# Patient Record
Sex: MALE | Race: WHITE | NOT HISPANIC OR LATINO | URBAN - METROPOLITAN AREA
[De-identification: names, ages, dates, MRNs, and addresses within clinical notes are randomized per-mention and may not be internally consistent; named-entity substitution may affect disease eponyms.]

---

## 2018-12-10 ENCOUNTER — OUTPATIENT (OUTPATIENT)
Dept: EMERGENCY DEPT | Facility: HOSPITAL | Age: 73
LOS: 1 days | Discharge: HOME | End: 2018-12-10
Payer: COMMERCIAL

## 2018-12-10 VITALS
OXYGEN SATURATION: 95 % | RESPIRATION RATE: 14 BRPM | SYSTOLIC BLOOD PRESSURE: 146 MMHG | TEMPERATURE: 98 F | HEART RATE: 88 BPM | DIASTOLIC BLOOD PRESSURE: 78 MMHG

## 2018-12-10 VITALS — WEIGHT: 205.03 LBS | HEIGHT: 72 IN

## 2018-12-10 LAB
ABO RH CONFIRMATION: SIGNIFICANT CHANGE UP
ALBUMIN SERPL ELPH-MCNC: 4.3 G/DL — SIGNIFICANT CHANGE UP (ref 3.3–5)
ALP SERPL-CCNC: 52 U/L — SIGNIFICANT CHANGE UP (ref 40–120)
ALT FLD-CCNC: 31 U/L — SIGNIFICANT CHANGE UP (ref 12–78)
ANION GAP SERPL CALC-SCNC: 9 MMOL/L — SIGNIFICANT CHANGE UP (ref 5–17)
APTT BLD: 27.2 SEC — LOW (ref 27.5–36.3)
AST SERPL-CCNC: 28 U/L — SIGNIFICANT CHANGE UP (ref 15–37)
BASOPHILS # BLD AUTO: 0.04 K/UL — SIGNIFICANT CHANGE UP (ref 0–0.2)
BASOPHILS NFR BLD AUTO: 0.4 % — SIGNIFICANT CHANGE UP (ref 0–2)
BILIRUB SERPL-MCNC: 0.8 MG/DL — SIGNIFICANT CHANGE UP (ref 0.2–1.2)
BLD GP AB SCN SERPL QL: SIGNIFICANT CHANGE UP
BUN SERPL-MCNC: 14 MG/DL — SIGNIFICANT CHANGE UP (ref 7–23)
CALCIUM SERPL-MCNC: 9.1 MG/DL — SIGNIFICANT CHANGE UP (ref 8.5–10.1)
CHLORIDE SERPL-SCNC: 105 MMOL/L — SIGNIFICANT CHANGE UP (ref 96–108)
CO2 SERPL-SCNC: 24 MMOL/L — SIGNIFICANT CHANGE UP (ref 22–31)
CREAT SERPL-MCNC: 0.88 MG/DL — SIGNIFICANT CHANGE UP (ref 0.5–1.3)
EOSINOPHIL # BLD AUTO: 0.03 K/UL — SIGNIFICANT CHANGE UP (ref 0–0.5)
EOSINOPHIL NFR BLD AUTO: 0.3 % — SIGNIFICANT CHANGE UP (ref 0–6)
GLUCOSE SERPL-MCNC: 210 MG/DL — HIGH (ref 70–99)
HCT VFR BLD CALC: 47.2 % — SIGNIFICANT CHANGE UP (ref 39–50)
HGB BLD-MCNC: 16 G/DL — SIGNIFICANT CHANGE UP (ref 13–17)
IMM GRANULOCYTES NFR BLD AUTO: 0.5 % — SIGNIFICANT CHANGE UP (ref 0–1.5)
INR BLD: 1 RATIO — SIGNIFICANT CHANGE UP (ref 0.88–1.16)
LACTATE SERPL-SCNC: 1.5 MMOL/L — SIGNIFICANT CHANGE UP (ref 0.7–2)
LIDOCAIN IGE QN: 141 U/L — SIGNIFICANT CHANGE UP (ref 73–393)
LYMPHOCYTES # BLD AUTO: 1.69 K/UL — SIGNIFICANT CHANGE UP (ref 1–3.3)
LYMPHOCYTES # BLD AUTO: 15.4 % — SIGNIFICANT CHANGE UP (ref 13–44)
MCHC RBC-ENTMCNC: 33.1 PG — SIGNIFICANT CHANGE UP (ref 27–34)
MCHC RBC-ENTMCNC: 33.9 GM/DL — SIGNIFICANT CHANGE UP (ref 32–36)
MCV RBC AUTO: 97.5 FL — SIGNIFICANT CHANGE UP (ref 80–100)
MONOCYTES # BLD AUTO: 0.81 K/UL — SIGNIFICANT CHANGE UP (ref 0–0.9)
MONOCYTES NFR BLD AUTO: 7.4 % — SIGNIFICANT CHANGE UP (ref 2–14)
NEUTROPHILS # BLD AUTO: 8.35 K/UL — HIGH (ref 1.8–7.4)
NEUTROPHILS NFR BLD AUTO: 76 % — SIGNIFICANT CHANGE UP (ref 43–77)
NRBC # BLD: 0 /100 WBCS — SIGNIFICANT CHANGE UP (ref 0–0)
PLATELET # BLD AUTO: 209 K/UL — SIGNIFICANT CHANGE UP (ref 150–400)
POTASSIUM SERPL-MCNC: 4.5 MMOL/L — SIGNIFICANT CHANGE UP (ref 3.5–5.3)
POTASSIUM SERPL-SCNC: 4.5 MMOL/L — SIGNIFICANT CHANGE UP (ref 3.5–5.3)
PROT SERPL-MCNC: 8 GM/DL — SIGNIFICANT CHANGE UP (ref 6–8.3)
PROTHROM AB SERPL-ACNC: 11.1 SEC — SIGNIFICANT CHANGE UP (ref 10–12.9)
RBC # BLD: 4.84 M/UL — SIGNIFICANT CHANGE UP (ref 4.2–5.8)
RBC # FLD: 12.1 % — SIGNIFICANT CHANGE UP (ref 10.3–14.5)
SODIUM SERPL-SCNC: 138 MMOL/L — SIGNIFICANT CHANGE UP (ref 135–145)
TYPE + AB SCN PNL BLD: SIGNIFICANT CHANGE UP
WBC # BLD: 10.97 K/UL — HIGH (ref 3.8–10.5)
WBC # FLD AUTO: 10.97 K/UL — HIGH (ref 3.8–10.5)

## 2018-12-10 PROCEDURE — 93010 ELECTROCARDIOGRAM REPORT: CPT

## 2018-12-10 PROCEDURE — 99285 EMERGENCY DEPT VISIT HI MDM: CPT

## 2018-12-10 RX ORDER — METOPROLOL TARTRATE 50 MG
25 TABLET ORAL DAILY
Qty: 0 | Refills: 0 | Status: DISCONTINUED | OUTPATIENT
Start: 2018-12-10 | End: 2018-12-25

## 2018-12-10 RX ORDER — GLUCAGON INJECTION, SOLUTION 0.5 MG/.1ML
0.2 INJECTION, SOLUTION SUBCUTANEOUS ONCE
Qty: 0 | Refills: 0 | Status: COMPLETED | OUTPATIENT
Start: 2018-12-10 | End: 2018-12-10

## 2018-12-10 RX ORDER — SODIUM CHLORIDE 9 MG/ML
3 INJECTION INTRAMUSCULAR; INTRAVENOUS; SUBCUTANEOUS ONCE
Qty: 0 | Refills: 0 | Status: COMPLETED | OUTPATIENT
Start: 2018-12-10 | End: 2018-12-10

## 2018-12-10 RX ORDER — SODIUM CHLORIDE 9 MG/ML
1000 INJECTION INTRAMUSCULAR; INTRAVENOUS; SUBCUTANEOUS ONCE
Qty: 0 | Refills: 0 | Status: COMPLETED | OUTPATIENT
Start: 2018-12-10 | End: 2018-12-10

## 2018-12-10 RX ADMIN — SODIUM CHLORIDE 1000 MILLILITER(S): 9 INJECTION INTRAMUSCULAR; INTRAVENOUS; SUBCUTANEOUS at 19:45

## 2018-12-10 RX ADMIN — GLUCAGON INJECTION, SOLUTION 0.2 MILLIGRAM(S): 0.5 INJECTION, SOLUTION SUBCUTANEOUS at 19:45

## 2018-12-10 RX ADMIN — SODIUM CHLORIDE 3 MILLILITER(S): 9 INJECTION INTRAMUSCULAR; INTRAVENOUS; SUBCUTANEOUS at 19:47

## 2018-12-10 NOTE — ED STATDOCS - MEDICAL DECISION MAKING DETAILS
Plan to r/o pharyngeal foreign body with CT, labs, GI consultation 73 y/o male with a PMHx of  presents to the ED c/o foreign body throat. Pt states he was eating steak dinner last night at 2100 when a piece of steak got stuck in his throat.  VSS WNL.  Exam epigastric TTP. Plan to r/o pharyngeal foreign body with CT AP.  Tx with glucagon.  Metabolic and hematologic evaluation, GI consultation, symptomatic treatment, admit for endoscopy.  Reassess.

## 2018-12-10 NOTE — CONSULT NOTE ADULT - SUBJECTIVE AND OBJECTIVE BOX
Patient is a 72y old  Male who presents with a chief complaint of     HPI: 71yo male with hx mild intermittent food impaction was eating steak last PM when felt get stuck.  He thought would pass by itself but has persisted.  he can toelrate his own saliva but developing increasing discomfort.  In the past, has passed by itself.    Last EGD years ago    PAST MEDICAL & SURGICAL HISTORY:  HTN   DM    MEDICATIONS  (STANDING):  metformin  metoprolol    MEDICATIONS  (PRN):    Allergies  No Known Allergies  SOCIAL HISTORY: occ etoh    FAMILY HISTORY: nc      REVIEW OF SYSTEMS:    CONSTITUTIONAL: No weakness, fevers or chills  EYES/ENT: No visual changes;  No vertigo or throat pain   NECK: No pain or stiffness  RESPIRATORY: No cough, wheezing, hemoptysis; No shortness of breath  CARDIOVASCULAR: No chest pain or palpitations  GASTROINTESTINAL: as above  GENITOURINARY: No dysuria, frequency or hematuria  NEUROLOGICAL: No numbness or weakness  SKIN: No itching, burning, rashes, or lesions   PSYCH: Normal mood and affect  All other review of systems is negative unless indicated above.    Vital Signs Last 24 Hrs  T(C): 36.3 (10 Dec 2018 18:26), Max: 36.3 (10 Dec 2018 18:26)  T(F): 97.4 (10 Dec 2018 18:26), Max: 97.4 (10 Dec 2018 18:26)  HR: 87 (10 Dec 2018 18:26) (87 - 87)  BP: 149/79 (10 Dec 2018 18:26) (149/79 - 149/79)  BP(mean): --  RR: 18 (10 Dec 2018 18:26) (18 - 18)  SpO2: 99% (10 Dec 2018 18:26) (99% - 99%)    PHYSICAL EXAM:    Constitutional: NAD  HEENT: MMM  Neck: No LAD  Respiratory: CTA  Cardiovascular: S1 and S2  Gastrointestinal: BS+, soft, NT/ND  Extremities: No peripheral edema  Vascular: 2+ peripheral pulses  Neurological: A/O x 3, no focal deficits  Psychiatric: Normal mood, normal affect  Skin: No rashes    LABS:                        16.0   10.97 )-----------( 209      ( 10 Dec 2018 19:07 )             47.2     12-10    138  |  105  |  14  ----------------------------<  210<H>  4.5   |  24  |  0.88    Ca    9.1      10 Dec 2018 19:07    TPro  8.0  /  Alb  4.3  /  TBili  0.8  /  DBili  x   /  AST  28  /  ALT  31  /  AlkPhos  52  12-10    PT/INR - ( 10 Dec 2018 19:07 )   PT: 11.1 sec;   INR: 1.00 ratio         PTT - ( 10 Dec 2018 19:07 )  PTT:27.2 sec  LIVER FUNCTIONS - ( 10 Dec 2018 19:07 )  Alb: 4.3 g/dL / Pro: 8.0 gm/dL / ALK PHOS: 52 U/L / ALT: 31 U/L / AST: 28 U/L / GGT: x             RADIOLOGY & ADDITIONAL STUDIES:

## 2018-12-10 NOTE — ED STATDOCS - ATTENDING CONTRIBUTION TO CARE
SARA Briones MD,  performed the initial face to face bedside interview with this patient regarding history of present illness, review of symptoms and relevant past medical, social and family history.  I completed an independent physical examination.  I was the initial provider who evaluated this patient. I have signed out the follow up of any pending tests (i.e. labs, radiological studies) to the ACP.  I have communicated the patient’s plan of care and disposition with the ACP.

## 2018-12-10 NOTE — ED ADULT TRIAGE NOTE - CHIEF COMPLAINT QUOTE
C/O "something is stuck in my throat. Patient reports eating steak last night around 9 pm when steak got stuck. Patient denies SOB. Reports previous episode and was able to pass on its own.

## 2018-12-10 NOTE — ED STATDOCS - OBJECTIVE STATEMENT
73 y/o male with a PMHx of  presents to the ED c/o foreign body throat. Pt states he was eating steak dinner last night at 2100 when a piece of steak got stuck in his throat. Pt states he tried drinking different fluids to attempt to dislodge the foreign body but was unable to. Pt states when he swallows, the fluids come back up. Pt saw Dr. Kasper today who advised ED visit for further evaluation of foreign body. At time of eval, pt reports the foreign body feels like it is at the level of his chest, reports feeling uncomfortable. Denies fevers, chills, SOB. H/o similar episode in the past, but pt was able to pass foreign body on his own at that time. No other acute complaints at time of eval.

## 2018-12-10 NOTE — ED STATDOCS - NS_ ATTENDINGSCRIBEDETAILS _ED_A_ED_FT
The scribe's documentation has been prepared under my direction and personally reviewed by me in its entirety.  I confirm that the note above accurately reflects all my work, treatment, procedures, and decision making except where otherwise noted or amended by me.  Alexi Briones M.D.

## 2018-12-10 NOTE — ED STATDOCS - PROGRESS NOTE DETAILS
73 y/o M with PMH of DM, HTN presents with possible foreign body in esophagus. Pt states he was eating steak last night and since that time he has had difficulty swallowing. States he is unable to swallow solids or liquids including secretions without vomiting. Reports epigastric pain. States he has experienced similar episode in the past noting "I think it was caused by steak at that time too." Reports no fever, chills, SOB/difficulty breathing. 73 y/o M with PMH of DM, HTN presents with possible foreign body in esophagus. Pt states he was eating steak last night and since that time he has had difficulty swallowing. States he is unable to swallow solids or liquids including secretions without vomiting. Reports epigastric pain. States he has experienced similar episode in the past noting "I think it was caused by steak at that time too." Reports no fever, chills, SOB/difficulty breathing. PE: NAD. Cardiac: s1/s2, RRR. Lungs: CTAB. Abdomen: NBS x4, soft, mild epigastric tenderness. HEENT: oropharynx clear, no oropharyngeal erythema, tonsilar enlargement/exudates, uvular deviation, obvious foreign body. Able to swallow. A/P: concern for foreign body, plan for labs, glucagon, CT abdomen/pelvis. D/w Dr. Shah, recommending admission. Will follow patient in AM. - Sathish Bob PA-C

## 2018-12-10 NOTE — BRIEF OPERATIVE NOTE - PROCEDURE
<<-----Click on this checkbox to enter Procedure Upper endoscopy  12/10/2018  with removal forrign body  Active  DPUROW
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
## 2018-12-21 DIAGNOSIS — K22.10 ULCER OF ESOPHAGUS WITHOUT BLEEDING: ICD-10-CM

## 2018-12-21 DIAGNOSIS — I10 ESSENTIAL (PRIMARY) HYPERTENSION: ICD-10-CM

## 2018-12-21 DIAGNOSIS — T18.128A FOOD IN ESOPHAGUS CAUSING OTHER INJURY, INITIAL ENCOUNTER: ICD-10-CM

## 2018-12-21 DIAGNOSIS — K26.9 DUODENAL ULCER, UNSPECIFIED AS ACUTE OR CHRONIC, WITHOUT HEMORRHAGE OR PERFORATION: ICD-10-CM

## 2018-12-21 DIAGNOSIS — K44.9 DIAPHRAGMATIC HERNIA WITHOUT OBSTRUCTION OR GANGRENE: ICD-10-CM

## 2018-12-21 DIAGNOSIS — E11.9 TYPE 2 DIABETES MELLITUS WITHOUT COMPLICATIONS: ICD-10-CM
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
## 2018-12-21 DIAGNOSIS — Z79.84 LONG TERM (CURRENT) USE OF ORAL HYPOGLYCEMIC DRUGS: ICD-10-CM

## 2019-06-04 PROBLEM — I10 ESSENTIAL (PRIMARY) HYPERTENSION: Chronic | Status: ACTIVE | Noted: 2018-12-26

## 2019-06-04 PROBLEM — E11.9 TYPE 2 DIABETES MELLITUS WITHOUT COMPLICATIONS: Chronic | Status: ACTIVE | Noted: 2018-12-26

## 2019-06-12 ENCOUNTER — APPOINTMENT (OUTPATIENT)
Dept: GASTROENTEROLOGY | Facility: CLINIC | Age: 74
End: 2019-06-12
Payer: MEDICARE

## 2019-06-12 VITALS
BODY MASS INDEX: 28.58 KG/M2 | WEIGHT: 211 LBS | DIASTOLIC BLOOD PRESSURE: 83 MMHG | HEART RATE: 82 BPM | HEIGHT: 72 IN | SYSTOLIC BLOOD PRESSURE: 132 MMHG

## 2019-06-12 DIAGNOSIS — Z78.9 OTHER SPECIFIED HEALTH STATUS: ICD-10-CM

## 2019-06-12 PROCEDURE — 99214 OFFICE O/P EST MOD 30 MIN: CPT

## 2019-06-16 PROBLEM — Z78.9 DOES NOT USE TOBACCO: Status: ACTIVE | Noted: 2019-06-12

## 2019-06-16 PROBLEM — Z78.9 CAFFEINE USE: Status: ACTIVE | Noted: 2019-06-12

## 2019-06-16 PROBLEM — Z78.9 DRINKS WINE: Status: ACTIVE | Noted: 2019-06-12

## 2019-06-16 NOTE — HISTORY OF PRESENT ILLNESS
[de-identified] : This is a 73-year-old gentleman who presents with GERD and dysphagia. The patient notes having had dysphagia for about 20 years. He had upper endoscopy and barium esophagram several years ago which wer enegative and hiss ymptoms were attributed to esophageal spasm. 6 months ago I performed an upper endoscopy for an esophageal food impaction. He notes this is the only time he needed a procedure done urgently. Food is getting stuck into his chest and he is usually able to bring it up or get it down.\par He had a recent episode of steak stuck for hours that passed on the way to the ER

## 2019-06-16 NOTE — ASSESSMENT
[FreeTextEntry1] : 74yo male with dysphagia with hx food impaction \par While he believes is due to spasm I recommended repeat egd to further evaluate\par Risks and benefits of procedure(s) discussed with patient in detail, including but not limited to, perforation, bleeding, reaction to anesthesia.\par

## 2019-06-16 NOTE — PHYSICAL EXAM
[General Appearance - Alert] : alert [General Appearance - In No Acute Distress] : in no acute distress [Sclera] : the sclera and conjunctiva were normal [PERRL With Normal Accommodation] : pupils were equal in size, round, and reactive to light [Outer Ear] : the ears and nose were normal in appearance [Extraocular Movements] : extraocular movements were intact [Oropharynx] : the oropharynx was normal [Neck Appearance] : the appearance of the neck was normal [Neck Cervical Mass (___cm)] : no neck mass was observed [Thyroid Diffuse Enlargement] : the thyroid was not enlarged [Jugular Venous Distention Increased] : there was no jugular-venous distention [Thyroid Nodule] : there were no palpable thyroid nodules [Auscultation Breath Sounds / Voice Sounds] : lungs were clear to auscultation bilaterally [Murmurs] : no murmurs [Heart Rate And Rhythm] : heart rate was normal and rhythm regular [Heart Sounds Gallop] : no gallops [Heart Sounds] : normal S1 and S2 [Full Pulse] : the pedal pulses are present [Heart Sounds Pericardial Friction Rub] : no pericardial rub [Bowel Sounds] : normal bowel sounds [Edema] : there was no peripheral edema [Abdomen Soft] : soft [Abdomen Mass (___ Cm)] : no abdominal mass palpated [Abdomen Tenderness] : non-tender [] : no hepato-splenomegaly [Musculoskeletal - Swelling] : no joint swelling seen [Nail Clubbing] : no clubbing  or cyanosis of the fingernails [Abnormal Walk] : normal gait [Affect] : the affect was normal [Impaired Insight] : insight and judgment were intact [Motor Tone] : muscle strength and tone were normal [Oriented To Time, Place, And Person] : oriented to person, place, and time

## 2021-05-12 ENCOUNTER — RX RENEWAL (OUTPATIENT)
Age: 76
End: 2021-05-12

## 2021-05-12 DIAGNOSIS — Z86.79 PERSONAL HISTORY OF OTHER DISEASES OF THE CIRCULATORY SYSTEM: ICD-10-CM

## 2021-05-12 DIAGNOSIS — N52.9 MALE ERECTILE DYSFUNCTION, UNSPECIFIED: ICD-10-CM

## 2021-05-12 DIAGNOSIS — Z86.69 PERSONAL HISTORY OF OTHER DISEASES OF THE NERVOUS SYSTEM AND SENSE ORGANS: ICD-10-CM

## 2021-05-12 DIAGNOSIS — R22.1 LOCALIZED SWELLING, MASS AND LUMP, NECK: ICD-10-CM

## 2021-05-12 RX ORDER — ASPIRIN ENTERIC COATED TABLETS 81 MG 81 MG/1
81 TABLET, DELAYED RELEASE ORAL
Qty: 90 | Refills: 3 | Status: ACTIVE | COMMUNITY
Start: 2021-05-12

## 2021-05-12 RX ORDER — METOPROLOL SUCCINATE 200 MG/1
TABLET, EXTENDED RELEASE ORAL
Refills: 0 | Status: DISCONTINUED | COMMUNITY
End: 2021-05-12

## 2021-05-12 RX ORDER — METFORMIN HYDROCHLORIDE 500 MG/1
500 TABLET, COATED ORAL
Refills: 0 | Status: DISCONTINUED | COMMUNITY
End: 2021-05-12

## 2021-05-13 ENCOUNTER — NON-APPOINTMENT (OUTPATIENT)
Age: 76
End: 2021-05-13

## 2021-05-13 DIAGNOSIS — Z87.891 PERSONAL HISTORY OF NICOTINE DEPENDENCE: ICD-10-CM

## 2021-05-13 DIAGNOSIS — Z72.89 OTHER PROBLEMS RELATED TO LIFESTYLE: ICD-10-CM

## 2021-05-13 DIAGNOSIS — Z82.49 FAMILY HISTORY OF ISCHEMIC HEART DISEASE AND OTHER DISEASES OF THE CIRCULATORY SYSTEM: ICD-10-CM

## 2021-07-06 ENCOUNTER — APPOINTMENT (OUTPATIENT)
Dept: FAMILY MEDICINE | Facility: CLINIC | Age: 76
End: 2021-07-06
Payer: MEDICARE

## 2021-07-06 ENCOUNTER — NON-APPOINTMENT (OUTPATIENT)
Age: 76
End: 2021-07-06

## 2021-07-06 VITALS
DIASTOLIC BLOOD PRESSURE: 80 MMHG | HEIGHT: 72 IN | OXYGEN SATURATION: 98 % | SYSTOLIC BLOOD PRESSURE: 126 MMHG | TEMPERATURE: 97.2 F | HEART RATE: 79 BPM | WEIGHT: 208 LBS | BODY MASS INDEX: 28.17 KG/M2

## 2021-07-06 PROCEDURE — G0439: CPT

## 2021-07-06 PROCEDURE — 36415 COLL VENOUS BLD VENIPUNCTURE: CPT

## 2021-07-06 PROCEDURE — 99072 ADDL SUPL MATRL&STAF TM PHE: CPT

## 2021-07-06 PROCEDURE — 93000 ELECTROCARDIOGRAM COMPLETE: CPT

## 2021-07-06 RX ORDER — TADALAFIL 2.5 MG/1
2.5 TABLET, FILM COATED ORAL
Refills: 0 | Status: DISCONTINUED | COMMUNITY
End: 2021-07-06

## 2021-07-06 NOTE — HISTORY OF PRESENT ILLNESS
[FreeTextEntry1] : PIA DE ANDA is a 75 year old male here for a physical exam.\par  [de-identified] : His last PE was in 5/2018\par His last tetanus shot was unknown\par He has had Prevnar and Pneumovax \par He has not had Shingrix \par His last dentist visit was 2 years ago\par His last eye doctor appointment was many years ago\par His last dermatologist visit was \par His diet is moderately healthy\par Exercise: rarely\par His last colonoscopy was in 7/2010\par \par He complains of numbness in both feet. He denies pain but the feet feel "fuzzy". He also reports a lump in his left knee a few months ago. He states that this gradually got as large as a golf ball and eventually resolved. He just wanted to mention this. He also notes that his hiatal hernia has been bothering him. He gets palpitations when lying down at night. He also feels an elevated heart rate when walking up a flight of stairs. He has a history of atrial fibrillation diagnosed in 2019. This resolved by the time he saw the cardiologist and his cardiac workup was negative. He feels that the palpitations are directly related to his hiatal hernia. He denies heartburn but has had difficulty swallowing in the past. He had an EGD in 12/2018 in the hospital when he had a food bolus stuck in the esophagus. This has not happened since.

## 2021-07-06 NOTE — PLAN
[FreeTextEntry1] : Reviewed age-appropriate preventive screening tests with patient. He is due for a colonoscopy. He is also due for Tdap and Shingrix. He is also due to see a dentist and eye doctor. I recommended an ophthalmologist due to his history of diabetes.\par \par Discussed clean eating (e.g. Mediterranean style diet) and recommendations for regular exercise/staying as physically active as possible.\par \par Reviewed importance of good self care (e.g. meditation, yoga, adequate rest, regular exercise, magnesium, clean eating, etc.).\par \par The most pressing issue at present is his atrial fibrillation. He initially had this in 2018 and it resolved spontaneously when he was prescribed Metoprolol. His symptoms returned months ago but he does not know exactly when. He is willing to return to see Dr. Luong who he saw in 2018. I scheduled him an appointment with Dr. Luong for next Tuesday 7/13. In the meantime he will increase his Metoprolol to 25 mg BID since his symptoms mainly occur at night. Ideally he should be taking additional anticoagulation but I would like Dr. Luong's input on this first. Since this is apparently not new onset A. fib, I do not feel that his risk of a thrombotic event is overly high if he waits another week to start anticoagulation,

## 2021-07-06 NOTE — REVIEW OF SYSTEMS
[Negative] : Heme/Lymph [Palpitations] : palpitations [de-identified] : paresthesia, bilateral feet

## 2021-07-06 NOTE — PHYSICAL EXAM
[No Acute Distress] : no acute distress [Well Nourished] : well nourished [Well Developed] : well developed [Well-Appearing] : well-appearing [Normal Sclera/Conjunctiva] : normal sclera/conjunctiva [PERRL] : pupils equal round and reactive to light [EOMI] : extraocular movements intact [Normal Outer Ear/Nose] : the outer ears and nose were normal in appearance [Normal Oropharynx] : the oropharynx was normal [No JVD] : no jugular venous distention [No Lymphadenopathy] : no lymphadenopathy [Supple] : supple [Thyroid Normal, No Nodules] : the thyroid was normal and there were no nodules present [No Respiratory Distress] : no respiratory distress  [No Accessory Muscle Use] : no accessory muscle use [Clear to Auscultation] : lungs were clear to auscultation bilaterally [No Carotid Bruits] : no carotid bruits [No Abdominal Bruit] : a ~M bruit was not heard ~T in the abdomen [No Varicosities] : no varicosities [Pedal Pulses Present] : the pedal pulses are present [No Edema] : there was no peripheral edema [No Palpable Aorta] : no palpable aorta [No Extremity Clubbing/Cyanosis] : no extremity clubbing/cyanosis [Soft] : abdomen soft [Non Tender] : non-tender [Non-distended] : non-distended [No Masses] : no abdominal mass palpated [No HSM] : no HSM [Normal Bowel Sounds] : normal bowel sounds [Normal Posterior Cervical Nodes] : no posterior cervical lymphadenopathy [Normal Anterior Cervical Nodes] : no anterior cervical lymphadenopathy [No CVA Tenderness] : no CVA  tenderness [No Spinal Tenderness] : no spinal tenderness [No Joint Swelling] : no joint swelling [Grossly Normal Strength/Tone] : grossly normal strength/tone [No Rash] : no rash [Coordination Grossly Intact] : coordination grossly intact [No Focal Deficits] : no focal deficits [Normal Gait] : normal gait [Deep Tendon Reflexes (DTR)] : deep tendon reflexes were 2+ and symmetric [Normal Insight/Judgement] : insight and judgment were intact [Normal Affect] : the affect was normal [de-identified] : irregular S1, S2, no murmur

## 2021-07-06 NOTE — ASSESSMENT
[FreeTextEntry1] : PIA DE ANDA is a 75 year old male here for a physical exam. He has a history of hyperlipidemia and diabetes and has not been compliant with follow up. His last visit was in 2019. He has symptoms consistent with diabetic neuropathy but declines medication for this.\par \par He had an episode of atrial fibrillation discovered in 10/2018. He was started on Metoprolol and referred to cardiology. By the time he saw the cardiologist he had returned to sinus rhythm. Baby aspirin was recommended but no additional anticoagulation was recommended at that time. He is now back in atrial fibrillation. This is the likely cause of his palpitations when he lies down at night or exerts himself walking up a flight of stairs. Based upon his symptoms he has likely been back in A. fib for months or longer.\par

## 2021-07-07 ENCOUNTER — NON-APPOINTMENT (OUTPATIENT)
Age: 76
End: 2021-07-07

## 2021-07-07 LAB
ALBUMIN SERPL ELPH-MCNC: 4.5 G/DL
ALP BLD-CCNC: 51 U/L
ALT SERPL-CCNC: 24 U/L
ANION GAP SERPL CALC-SCNC: 13 MMOL/L
AST SERPL-CCNC: 20 U/L
BILIRUB SERPL-MCNC: 0.7 MG/DL
BUN SERPL-MCNC: 15 MG/DL
CALCIUM SERPL-MCNC: 9.8 MG/DL
CHLORIDE SERPL-SCNC: 100 MMOL/L
CHOLEST SERPL-MCNC: 170 MG/DL
CO2 SERPL-SCNC: 24 MMOL/L
CREAT SERPL-MCNC: 0.85 MG/DL
CREAT SPEC-SCNC: 111 MG/DL
ESTIMATED AVERAGE GLUCOSE: 272 MG/DL
GLUCOSE SERPL-MCNC: 312 MG/DL
HBA1C MFR BLD HPLC: 11.1 %
HDLC SERPL-MCNC: 44 MG/DL
LDLC SERPL CALC-MCNC: 100 MG/DL
MICROALBUMIN 24H UR DL<=1MG/L-MCNC: 1.3 MG/DL
MICROALBUMIN/CREAT 24H UR-RTO: 12 MG/G
NONHDLC SERPL-MCNC: 125 MG/DL
POTASSIUM SERPL-SCNC: 5.4 MMOL/L
PROT SERPL-MCNC: 6.7 G/DL
PSA SERPL-MCNC: 0.95 NG/ML
SODIUM SERPL-SCNC: 137 MMOL/L
TRIGL SERPL-MCNC: 125 MG/DL

## 2021-07-31 ENCOUNTER — RX RENEWAL (OUTPATIENT)
Age: 76
End: 2021-07-31

## 2021-08-07 ENCOUNTER — APPOINTMENT (OUTPATIENT)
Dept: DISASTER EMERGENCY | Facility: CLINIC | Age: 76
End: 2021-08-07

## 2021-08-08 ENCOUNTER — APPOINTMENT (OUTPATIENT)
Dept: DISASTER EMERGENCY | Facility: CLINIC | Age: 76
End: 2021-08-08

## 2021-08-08 DIAGNOSIS — Z01.818 ENCOUNTER FOR OTHER PREPROCEDURAL EXAMINATION: ICD-10-CM

## 2021-08-09 LAB — SARS-COV-2 N GENE NPH QL NAA+PROBE: NOT DETECTED

## 2021-08-10 RX ORDER — METFORMIN HYDROCHLORIDE 850 MG/1
2 TABLET ORAL
Qty: 0 | Refills: 0 | DISCHARGE

## 2021-08-10 NOTE — ASU PATIENT PROFILE, ADULT - MEDICATIONS TO HOLD
Patient previously instructed by MD to hold eliquis 2 days prior to procedure and hold metformin a.m. of procedure.

## 2021-08-11 ENCOUNTER — OUTPATIENT (OUTPATIENT)
Dept: OUTPATIENT SERVICES | Facility: HOSPITAL | Age: 76
LOS: 1 days | Discharge: HOME | End: 2021-08-11
Payer: MEDICARE

## 2021-08-11 VITALS
HEART RATE: 90 BPM | OXYGEN SATURATION: 98 % | SYSTOLIC BLOOD PRESSURE: 132 MMHG | DIASTOLIC BLOOD PRESSURE: 95 MMHG | RESPIRATION RATE: 16 BRPM

## 2021-08-11 VITALS
WEIGHT: 208.34 LBS | HEART RATE: 68 BPM | HEIGHT: 72 IN | DIASTOLIC BLOOD PRESSURE: 88 MMHG | SYSTOLIC BLOOD PRESSURE: 149 MMHG | RESPIRATION RATE: 18 BRPM | TEMPERATURE: 98 F

## 2021-08-11 DIAGNOSIS — R94.39 ABNORMAL RESULT OF OTHER CARDIOVASCULAR FUNCTION STUDY: ICD-10-CM

## 2021-08-11 LAB
ALBUMIN SERPL ELPH-MCNC: 3.8 G/DL — SIGNIFICANT CHANGE UP (ref 3.3–5)
ALP SERPL-CCNC: 55 U/L — SIGNIFICANT CHANGE UP (ref 40–120)
ALT FLD-CCNC: 35 U/L — SIGNIFICANT CHANGE UP (ref 12–78)
ANION GAP SERPL CALC-SCNC: 5 MMOL/L — SIGNIFICANT CHANGE UP (ref 5–17)
AST SERPL-CCNC: 17 U/L — SIGNIFICANT CHANGE UP (ref 15–37)
BILIRUB SERPL-MCNC: 0.7 MG/DL — SIGNIFICANT CHANGE UP (ref 0.2–1.2)
BUN SERPL-MCNC: 14 MG/DL — SIGNIFICANT CHANGE UP (ref 7–23)
CALCIUM SERPL-MCNC: 9 MG/DL — SIGNIFICANT CHANGE UP (ref 8.5–10.1)
CHLORIDE SERPL-SCNC: 105 MMOL/L — SIGNIFICANT CHANGE UP (ref 96–108)
CO2 SERPL-SCNC: 27 MMOL/L — SIGNIFICANT CHANGE UP (ref 22–31)
CREAT SERPL-MCNC: 0.86 MG/DL — SIGNIFICANT CHANGE UP (ref 0.5–1.3)
GLUCOSE SERPL-MCNC: 277 MG/DL — HIGH (ref 70–99)
HCT VFR BLD CALC: 42.8 % — SIGNIFICANT CHANGE UP (ref 39–50)
HGB BLD-MCNC: 14.4 G/DL — SIGNIFICANT CHANGE UP (ref 13–17)
MCHC RBC-ENTMCNC: 33 PG — SIGNIFICANT CHANGE UP (ref 27–34)
MCHC RBC-ENTMCNC: 33.6 GM/DL — SIGNIFICANT CHANGE UP (ref 32–36)
MCV RBC AUTO: 97.9 FL — SIGNIFICANT CHANGE UP (ref 80–100)
PLATELET # BLD AUTO: 178 K/UL — SIGNIFICANT CHANGE UP (ref 150–400)
POTASSIUM SERPL-MCNC: 4.5 MMOL/L — SIGNIFICANT CHANGE UP (ref 3.5–5.3)
POTASSIUM SERPL-SCNC: 4.5 MMOL/L — SIGNIFICANT CHANGE UP (ref 3.5–5.3)
PROT SERPL-MCNC: 7.2 GM/DL — SIGNIFICANT CHANGE UP (ref 6–8.3)
RBC # BLD: 4.37 M/UL — SIGNIFICANT CHANGE UP (ref 4.2–5.8)
RBC # FLD: 12.2 % — SIGNIFICANT CHANGE UP (ref 10.3–14.5)
SODIUM SERPL-SCNC: 137 MMOL/L — SIGNIFICANT CHANGE UP (ref 135–145)
WBC # BLD: 6.29 K/UL — SIGNIFICANT CHANGE UP (ref 3.8–10.5)
WBC # FLD AUTO: 6.29 K/UL — SIGNIFICANT CHANGE UP (ref 3.8–10.5)

## 2021-08-11 PROCEDURE — 99153 MOD SED SAME PHYS/QHP EA: CPT

## 2021-08-11 PROCEDURE — 36415 COLL VENOUS BLD VENIPUNCTURE: CPT

## 2021-08-11 PROCEDURE — 99203 OFFICE O/P NEW LOW 30 MIN: CPT

## 2021-08-11 PROCEDURE — C1769: CPT

## 2021-08-11 PROCEDURE — 93458 L HRT ARTERY/VENTRICLE ANGIO: CPT

## 2021-08-11 PROCEDURE — 99152 MOD SED SAME PHYS/QHP 5/>YRS: CPT

## 2021-08-11 PROCEDURE — 85027 COMPLETE CBC AUTOMATED: CPT

## 2021-08-11 PROCEDURE — 80053 COMPREHEN METABOLIC PANEL: CPT

## 2021-08-11 PROCEDURE — C1887: CPT

## 2021-08-11 PROCEDURE — C1894: CPT

## 2021-08-11 RX ORDER — METFORMIN HYDROCHLORIDE 850 MG/1
4 TABLET ORAL
Qty: 0 | Refills: 0 | DISCHARGE

## 2021-08-11 RX ORDER — APIXABAN 2.5 MG/1
1 TABLET, FILM COATED ORAL
Qty: 0 | Refills: 0 | DISCHARGE

## 2021-08-11 RX ORDER — APIXABAN 2.5 MG/1
1 TABLET, FILM COATED ORAL
Qty: 0 | Refills: 0 | DISCHARGE
Start: 2021-08-11

## 2021-08-11 NOTE — PROGRESS NOTE ADULT - SUBJECTIVE AND OBJECTIVE BOX
HPI:  75yr old male PMH T2DM, afib on Eliquis, mild-moderate MR who has been experiencing SOB and palpitation for couple of months seen by cardiologist. Pt found to have abnormal stress and was referred to cardiac cath for further evaluation.     Now, Pt is s/p LHC, revealed non-obstructive CAD.      ROS: denies chest pain/ pressure, SOB or palpitation     Vital Signs;  T(C): 36.5 (08-11-21 @ 12:47), Max: 36.5 (08-11-21 @ 12:47)  HR: 95 (08-11-21 @ 14:50) (68 - 95)  BP: 144/55 (08-11-21 @ 14:50) (138/96 - 149/88)  RR: 16 (08-11-21 @ 14:50) (16 - 18)  SpO2: 95% (08-11-21 @ 14:50) (95% - 95%)    PHYSICAL EXAM:  GENERAL: NAD, well-groomed, well-developed  HEENT - NC/AT, pupils equal and reactive to light,  ; Moist mucous membranes, Good dentition, No lesions  NECK: Supple, No JVD  CHEST/LUNG: Clear to auscultation bilaterally; No rales, rhonchi, wheezing  HEART: Regular rate and rhythm; No murmurs, rubs, or gallops  ABDOMEN: Soft, Nontender, Nondistended; Bowel sounds present  EXTREMITIES:  2+ Peripheral Pulses, No clubbing, cyanosis, or edema  NEURO:  No Focal deficits, sensory and motor intact  SKIN: No rashes or lesions, Rt radial access site with radial band (to be removed in 1 hr post procedure): no hematoma or bleeding.     Medications:  Home Medications:  Eliquis 5 mg oral tablet: 1 tab(s) orally 2 times a day  Resume tonight at 10 pm (8/11/21) (11 Aug 2021 14:53)  Januvia 100 mg oral tablet: 1 tab(s) orally once a day (11 Aug 2021 14:51)  Low Dose ASA 81 mg oral tablet: 1 tab(s) orally once a day (11 Aug 2021 14:51)  metFORMIN 500 mg oral tablet, extended release: 4 tab(s) orally once a day  Resume on 8/14/21 (11 Aug 2021 14:51)  metoprolol succinate 25 mg oral tablet, extended release: 1 tab(s) orally once a day (11 Aug 2021 14:51)    Cath report: pending     A/P:  75yr old male PMH T2DM, AFIB on Eliquis, mild-moderate MR who has been experiencing SOB and palpitation for couple of months seen by cardiologist. Pt found to have abnormal stress and was referred to cardiac cath for further evaluation. Now, Pt is s/p [facility], revealed non-obstructive CAD.   - continue ASA 81 mg   - resume Eliquis 5 mg BID tonight a t 10 pm   - Continue Metoprolol ER 25 mg daily   - resume Metformin on 8/14/21  - consider to start statin, Pt to follow up with Dr. Luong   - post procedure, outcome and follow up care reviewed with patient and Dr. Haider   - Discussed therapeutic lifestyle modifications to reduce CAD risk factors including cardiac diet, weight control, exercise, smoking cessation, medication compliance and regular outpt follow-up.   - discharge home today   - follow up with Dr. Luong in 1-2 weeks as an outpt     Discussed the plan with Dr. Haider, Pt and Cath RN.

## 2021-08-11 NOTE — PRE-OP CHECKLIST - NS PREOP CHK CHLOROHEX WASH
Your influenza test, RSV test and strep test were all negative. Your son was found to have a left-sided middle ear infection.  You are advised to follow up with his pediatrician for re-evaluation within 3 days.  You are instructed to return to the emergency department immediately for any new or worsening symptoms.   
N/A

## 2021-08-14 RX ORDER — METFORMIN HYDROCHLORIDE 850 MG/1
2 TABLET ORAL
Qty: 0 | Refills: 0 | DISCHARGE
Start: 2021-08-14

## 2021-08-14 RX ORDER — METFORMIN HYDROCHLORIDE 850 MG/1
4 TABLET ORAL
Qty: 0 | Refills: 0 | DISCHARGE
Start: 2021-08-14

## 2021-08-17 DIAGNOSIS — I25.118 ATHEROSCLEROTIC HEART DISEASE OF NATIVE CORONARY ARTERY WITH OTHER FORMS OF ANGINA PECTORIS: ICD-10-CM

## 2021-08-17 DIAGNOSIS — E11.9 TYPE 2 DIABETES MELLITUS WITHOUT COMPLICATIONS: ICD-10-CM

## 2021-08-17 DIAGNOSIS — I10 ESSENTIAL (PRIMARY) HYPERTENSION: ICD-10-CM

## 2021-08-17 DIAGNOSIS — I20.8 OTHER FORMS OF ANGINA PECTORIS: ICD-10-CM

## 2021-09-27 ENCOUNTER — APPOINTMENT (OUTPATIENT)
Dept: DISASTER EMERGENCY | Facility: CLINIC | Age: 76
End: 2021-09-27

## 2021-09-28 LAB — SARS-COV-2 N GENE NPH QL NAA+PROBE: NOT DETECTED

## 2021-09-29 RX ORDER — SITAGLIPTIN 50 MG/1
1 TABLET, FILM COATED ORAL
Qty: 0 | Refills: 0 | DISCHARGE

## 2021-09-29 RX ORDER — METOPROLOL TARTRATE 50 MG
1 TABLET ORAL
Qty: 0 | Refills: 0 | DISCHARGE

## 2021-09-29 NOTE — ASU PATIENT PROFILE, ADULT - ABLE TO REACH PT
Pt states that he is fully vaccinated and denies any symptoms of Covid, returned from [place] on 9/25./yes

## 2021-09-30 ENCOUNTER — OUTPATIENT (OUTPATIENT)
Dept: OUTPATIENT SERVICES | Facility: HOSPITAL | Age: 76
LOS: 1 days | Discharge: HOME | End: 2021-09-30
Payer: MEDICARE

## 2021-09-30 VITALS
WEIGHT: 210.1 LBS | HEART RATE: 98 BPM | OXYGEN SATURATION: 98 % | TEMPERATURE: 97 F | HEIGHT: 72 IN | SYSTOLIC BLOOD PRESSURE: 132 MMHG | DIASTOLIC BLOOD PRESSURE: 78 MMHG | RESPIRATION RATE: 18 BRPM

## 2021-09-30 VITALS
SYSTOLIC BLOOD PRESSURE: 139 MMHG | OXYGEN SATURATION: 98 % | RESPIRATION RATE: 18 BRPM | DIASTOLIC BLOOD PRESSURE: 85 MMHG | HEART RATE: 82 BPM

## 2021-09-30 DIAGNOSIS — I48.91 UNSPECIFIED ATRIAL FIBRILLATION: ICD-10-CM

## 2021-09-30 PROCEDURE — 93005 ELECTROCARDIOGRAM TRACING: CPT

## 2021-09-30 PROCEDURE — 92960 CARDIOVERSION ELECTRIC EXT: CPT

## 2021-09-30 PROCEDURE — 93010 ELECTROCARDIOGRAM REPORT: CPT

## 2021-09-30 NOTE — PROGRESS NOTE ADULT - SUBJECTIVE AND OBJECTIVE BOX
Patient is a 75y old  Male who presents with a chief complaint of       MEDICATIONS  (STANDING):    MEDICATIONS  (PRN):            Vital Signs Last 24 Hrs  T(C): 36.2 (30 Sep 2021 07:49), Max: 36.2 (30 Sep 2021 07:49)  T(F): 97.1 (30 Sep 2021 07:49), Max: 97.1 (30 Sep 2021 07:49)  HR: 82 (30 Sep 2021 09:29) (68 - 98)  BP: 139/85 (30 Sep 2021 09:29) (123/81 - 141/85)  BP(mean): --  RR: 18 (30 Sep 2021 09:29) (14 - 18)  SpO2: 98% (30 Sep 2021 09:29) (95% - 100%)            INTERPRETATION OF TELEMETRY:    ECG:        LABS:                  I&O's Summary    BNP  RADIOLOGY & ADDITIONAL STUDIES:            9/30- patient recieved 200 joules of synchrous energy via R 2 pads . this successfully cardioverted him to NSR   He tolerated the procedure well  will f/u with in 2 weeks

## 2021-09-30 NOTE — PROCEDURAL SAFETY CHECKLIST WITH OR WITHOUT SEDATION - NSPOSTCOMMENTFT_GEN_ALL_CORE
Patient tolerated procedure well. Patient tolerated procedure well. Cardioverted with 200J x 1, converted to NSR. Skin intact. No redness noted.

## 2021-09-30 NOTE — PACU DISCHARGE NOTE - COMMENTS
Discharge instructions given to patient. Signed copy given to patient. Answered all questions. Verbalized understanding. All belongings are with the patient. Denies pain or discomfort. Skin intact. No redness. Wife as designated , waiting in the lobby.

## 2021-10-01 DIAGNOSIS — I10 ESSENTIAL (PRIMARY) HYPERTENSION: ICD-10-CM

## 2021-10-01 DIAGNOSIS — E78.5 HYPERLIPIDEMIA, UNSPECIFIED: ICD-10-CM

## 2021-10-01 DIAGNOSIS — E11.9 TYPE 2 DIABETES MELLITUS WITHOUT COMPLICATIONS: ICD-10-CM

## 2021-10-01 DIAGNOSIS — Z79.84 LONG TERM (CURRENT) USE OF ORAL HYPOGLYCEMIC DRUGS: ICD-10-CM

## 2021-10-01 DIAGNOSIS — I48.91 UNSPECIFIED ATRIAL FIBRILLATION: ICD-10-CM

## 2021-10-01 DIAGNOSIS — Z79.82 LONG TERM (CURRENT) USE OF ASPIRIN: ICD-10-CM

## 2021-11-05 ENCOUNTER — APPOINTMENT (OUTPATIENT)
Dept: DISASTER EMERGENCY | Facility: CLINIC | Age: 76
End: 2021-11-05

## 2021-11-06 LAB — SARS-COV-2 N GENE NPH QL NAA+PROBE: NOT DETECTED

## 2021-11-08 ENCOUNTER — INPATIENT (INPATIENT)
Facility: HOSPITAL | Age: 76
LOS: 2 days | Discharge: HOME | DRG: 310 | End: 2021-11-11
Attending: INTERNAL MEDICINE | Admitting: INTERNAL MEDICINE
Payer: MEDICARE

## 2021-11-08 VITALS
TEMPERATURE: 98 F | SYSTOLIC BLOOD PRESSURE: 122 MMHG | RESPIRATION RATE: 18 BRPM | OXYGEN SATURATION: 100 % | DIASTOLIC BLOOD PRESSURE: 76 MMHG

## 2021-11-08 DIAGNOSIS — I48.91 UNSPECIFIED ATRIAL FIBRILLATION: ICD-10-CM

## 2021-11-08 LAB
ANION GAP SERPL CALC-SCNC: 6 MMOL/L — SIGNIFICANT CHANGE UP (ref 5–17)
BUN SERPL-MCNC: 17 MG/DL — SIGNIFICANT CHANGE UP (ref 7–23)
CALCIUM SERPL-MCNC: 9.4 MG/DL — SIGNIFICANT CHANGE UP (ref 8.5–10.1)
CHLORIDE SERPL-SCNC: 106 MMOL/L — SIGNIFICANT CHANGE UP (ref 96–108)
CO2 SERPL-SCNC: 28 MMOL/L — SIGNIFICANT CHANGE UP (ref 22–31)
CREAT SERPL-MCNC: 1.01 MG/DL — SIGNIFICANT CHANGE UP (ref 0.5–1.3)
GLUCOSE SERPL-MCNC: 167 MG/DL — HIGH (ref 70–99)
MAGNESIUM SERPL-MCNC: 2.2 MG/DL — SIGNIFICANT CHANGE UP (ref 1.6–2.6)
POTASSIUM SERPL-MCNC: 4.5 MMOL/L — SIGNIFICANT CHANGE UP (ref 3.5–5.3)
POTASSIUM SERPL-SCNC: 4.5 MMOL/L — SIGNIFICANT CHANGE UP (ref 3.5–5.3)
SODIUM SERPL-SCNC: 140 MMOL/L — SIGNIFICANT CHANGE UP (ref 135–145)

## 2021-11-08 PROCEDURE — 92960 CARDIOVERSION ELECTRIC EXT: CPT

## 2021-11-08 PROCEDURE — 82962 GLUCOSE BLOOD TEST: CPT

## 2021-11-08 PROCEDURE — U0005: CPT

## 2021-11-08 PROCEDURE — 83036 HEMOGLOBIN GLYCOSYLATED A1C: CPT

## 2021-11-08 PROCEDURE — 36415 COLL VENOUS BLD VENIPUNCTURE: CPT

## 2021-11-08 PROCEDURE — 85027 COMPLETE CBC AUTOMATED: CPT

## 2021-11-08 PROCEDURE — 93010 ELECTROCARDIOGRAM REPORT: CPT

## 2021-11-08 PROCEDURE — 83735 ASSAY OF MAGNESIUM: CPT

## 2021-11-08 PROCEDURE — 99223 1ST HOSP IP/OBS HIGH 75: CPT

## 2021-11-08 PROCEDURE — 93005 ELECTROCARDIOGRAM TRACING: CPT

## 2021-11-08 PROCEDURE — 80048 BASIC METABOLIC PNL TOTAL CA: CPT

## 2021-11-08 PROCEDURE — 86769 SARS-COV-2 COVID-19 ANTIBODY: CPT

## 2021-11-08 PROCEDURE — U0003: CPT

## 2021-11-08 RX ORDER — DEXTROSE 50 % IN WATER 50 %
15 SYRINGE (ML) INTRAVENOUS ONCE
Refills: 0 | Status: DISCONTINUED | OUTPATIENT
Start: 2021-11-08 | End: 2021-11-11

## 2021-11-08 RX ORDER — ASPIRIN/CALCIUM CARB/MAGNESIUM 324 MG
81 TABLET ORAL DAILY
Refills: 0 | Status: DISCONTINUED | OUTPATIENT
Start: 2021-11-09 | End: 2021-11-11

## 2021-11-08 RX ORDER — DEXTROSE 50 % IN WATER 50 %
12.5 SYRINGE (ML) INTRAVENOUS ONCE
Refills: 0 | Status: DISCONTINUED | OUTPATIENT
Start: 2021-11-08 | End: 2021-11-11

## 2021-11-08 RX ORDER — GLUCAGON INJECTION, SOLUTION 0.5 MG/.1ML
1 INJECTION, SOLUTION SUBCUTANEOUS ONCE
Refills: 0 | Status: DISCONTINUED | OUTPATIENT
Start: 2021-11-08 | End: 2021-11-11

## 2021-11-08 RX ORDER — DEXTROSE 50 % IN WATER 50 %
25 SYRINGE (ML) INTRAVENOUS ONCE
Refills: 0 | Status: DISCONTINUED | OUTPATIENT
Start: 2021-11-08 | End: 2021-11-11

## 2021-11-08 RX ORDER — ATORVASTATIN CALCIUM 80 MG/1
20 TABLET, FILM COATED ORAL AT BEDTIME
Refills: 0 | Status: DISCONTINUED | OUTPATIENT
Start: 2021-11-08 | End: 2021-11-11

## 2021-11-08 RX ORDER — SOTALOL HCL 120 MG
120 TABLET ORAL EVERY 12 HOURS
Refills: 0 | Status: DISCONTINUED | OUTPATIENT
Start: 2021-11-08 | End: 2021-11-11

## 2021-11-08 RX ORDER — INSULIN LISPRO 100/ML
VIAL (ML) SUBCUTANEOUS
Refills: 0 | Status: DISCONTINUED | OUTPATIENT
Start: 2021-11-08 | End: 2021-11-11

## 2021-11-08 RX ORDER — SODIUM CHLORIDE 9 MG/ML
1000 INJECTION, SOLUTION INTRAVENOUS
Refills: 0 | Status: DISCONTINUED | OUTPATIENT
Start: 2021-11-08 | End: 2021-11-11

## 2021-11-08 RX ORDER — APIXABAN 2.5 MG/1
5 TABLET, FILM COATED ORAL
Refills: 0 | Status: DISCONTINUED | OUTPATIENT
Start: 2021-11-08 | End: 2021-11-11

## 2021-11-08 RX ORDER — SODIUM CHLORIDE 9 MG/ML
3 INJECTION INTRAMUSCULAR; INTRAVENOUS; SUBCUTANEOUS EVERY 8 HOURS
Refills: 0 | Status: DISCONTINUED | OUTPATIENT
Start: 2021-11-08 | End: 2021-11-11

## 2021-11-08 RX ORDER — METOPROLOL TARTRATE 50 MG
1 TABLET ORAL
Qty: 0 | Refills: 0 | DISCHARGE

## 2021-11-08 RX ADMIN — SODIUM CHLORIDE 3 MILLILITER(S): 9 INJECTION INTRAMUSCULAR; INTRAVENOUS; SUBCUTANEOUS at 22:11

## 2021-11-08 RX ADMIN — Medication 120 MILLIGRAM(S): at 19:57

## 2021-11-08 RX ADMIN — Medication 2: at 17:34

## 2021-11-08 RX ADMIN — APIXABAN 5 MILLIGRAM(S): 2.5 TABLET, FILM COATED ORAL at 22:00

## 2021-11-08 RX ADMIN — ATORVASTATIN CALCIUM 20 MILLIGRAM(S): 80 TABLET, FILM COATED ORAL at 22:00

## 2021-11-08 NOTE — H&P CARDIOLOGY - COMMENTS
75 year old male with history of DM, HTN, persistent Afib on Eliquis who is admitted for Sotalol loading and cardioversion  Admit to [facility] 3, Continue Eliquis 5mg BID  Discontinue Metoprolol  Start Sotalol 120mg q12 x6 doses  EKG 2 hours after each dose of Sotalol to monitor QTc  If QTc >500ms hold Sotalol and contact EP for dose adjustment  Daily BMP and magnesium  Keep K>4, Mag > 2  Will follow  Plan discussed with patient and Dr. De León 75 year old male with history of DM, HTN, persistent Afib on Eliquis who is admitted for Sotalol loading and cardioversion  Admit to [facility] 3, Continue Eliquis 5mg BID  Discontinue Metoprolol  Check Stat BMP and Mag  Start Sotalol 120mg q12 x6 doses  EKG 2 hours after each dose of Sotalol to monitor QTc  If QTc >500ms hold Sotalol and contact EP for dose adjustment  Daily BMP and magnesium  Keep K>4, Mag > 2  Will follow  Plan discussed with patient and Dr. De León

## 2021-11-08 NOTE — H&P CARDIOLOGY - HISTORY OF PRESENT ILLNESS
75 year old male with history of DM, HLD, persistent atrial fibrillation who underwent cardioversion in June 2021 and had a recurrence of Afib approximately 4 hours after cardioversion. Patient is maintained on Eliquis and has not missed any doses. Patient with normal EF and non obstructive CAD on cardiac cath. Patient denies any CP, palpitations, SOB, dizziness, lightheadedness. Patient presents to [facility] for admission for Sotalol loading and cardioversion.

## 2021-11-09 LAB
A1C WITH ESTIMATED AVERAGE GLUCOSE RESULT: 9.8 % — HIGH (ref 4–5.6)
ANION GAP SERPL CALC-SCNC: 6 MMOL/L — SIGNIFICANT CHANGE UP (ref 5–17)
BUN SERPL-MCNC: 19 MG/DL — SIGNIFICANT CHANGE UP (ref 7–23)
CALCIUM SERPL-MCNC: 8.7 MG/DL — SIGNIFICANT CHANGE UP (ref 8.5–10.1)
CHLORIDE SERPL-SCNC: 107 MMOL/L — SIGNIFICANT CHANGE UP (ref 96–108)
CO2 SERPL-SCNC: 26 MMOL/L — SIGNIFICANT CHANGE UP (ref 22–31)
COVID-19 NUCLEOCAPSID GAM AB INTERP: NEGATIVE — SIGNIFICANT CHANGE UP
COVID-19 NUCLEOCAPSID TOTAL GAM ANTIBODY RESULT: 0.08 INDEX — SIGNIFICANT CHANGE UP
COVID-19 SPIKE DOMAIN AB INTERP: POSITIVE
COVID-19 SPIKE DOMAIN ANTIBODY RESULT: 231 U/ML — HIGH
CREAT SERPL-MCNC: 0.96 MG/DL — SIGNIFICANT CHANGE UP (ref 0.5–1.3)
ESTIMATED AVERAGE GLUCOSE: 235 MG/DL — HIGH (ref 68–114)
GLUCOSE SERPL-MCNC: 247 MG/DL — HIGH (ref 70–99)
MAGNESIUM SERPL-MCNC: 2 MG/DL — SIGNIFICANT CHANGE UP (ref 1.6–2.6)
POTASSIUM SERPL-MCNC: 4.5 MMOL/L — SIGNIFICANT CHANGE UP (ref 3.5–5.3)
POTASSIUM SERPL-SCNC: 4.5 MMOL/L — SIGNIFICANT CHANGE UP (ref 3.5–5.3)
SARS-COV-2 IGG+IGM SERPL QL IA: 0.08 INDEX — SIGNIFICANT CHANGE UP
SARS-COV-2 IGG+IGM SERPL QL IA: 231 U/ML — HIGH
SARS-COV-2 IGG+IGM SERPL QL IA: NEGATIVE — SIGNIFICANT CHANGE UP
SARS-COV-2 IGG+IGM SERPL QL IA: POSITIVE
SODIUM SERPL-SCNC: 139 MMOL/L — SIGNIFICANT CHANGE UP (ref 135–145)

## 2021-11-09 PROCEDURE — 93010 ELECTROCARDIOGRAM REPORT: CPT

## 2021-11-09 RX ORDER — INSULIN LISPRO 100/ML
VIAL (ML) SUBCUTANEOUS AT BEDTIME
Refills: 0 | Status: DISCONTINUED | OUTPATIENT
Start: 2021-11-09 | End: 2021-11-11

## 2021-11-09 RX ORDER — INSULIN GLARGINE 100 [IU]/ML
8 INJECTION, SOLUTION SUBCUTANEOUS AT BEDTIME
Refills: 0 | Status: DISCONTINUED | OUTPATIENT
Start: 2021-11-09 | End: 2021-11-11

## 2021-11-09 RX ADMIN — Medication 4: at 08:18

## 2021-11-09 RX ADMIN — APIXABAN 5 MILLIGRAM(S): 2.5 TABLET, FILM COATED ORAL at 21:41

## 2021-11-09 RX ADMIN — APIXABAN 5 MILLIGRAM(S): 2.5 TABLET, FILM COATED ORAL at 08:21

## 2021-11-09 RX ADMIN — Medication 1: at 21:47

## 2021-11-09 RX ADMIN — ATORVASTATIN CALCIUM 20 MILLIGRAM(S): 80 TABLET, FILM COATED ORAL at 21:41

## 2021-11-09 RX ADMIN — SODIUM CHLORIDE 3 MILLILITER(S): 9 INJECTION INTRAMUSCULAR; INTRAVENOUS; SUBCUTANEOUS at 13:52

## 2021-11-09 RX ADMIN — Medication 120 MILLIGRAM(S): at 19:55

## 2021-11-09 RX ADMIN — INSULIN GLARGINE 8 UNIT(S): 100 INJECTION, SOLUTION SUBCUTANEOUS at 21:46

## 2021-11-09 RX ADMIN — Medication 4: at 17:18

## 2021-11-09 RX ADMIN — SODIUM CHLORIDE 3 MILLILITER(S): 9 INJECTION INTRAMUSCULAR; INTRAVENOUS; SUBCUTANEOUS at 21:49

## 2021-11-09 RX ADMIN — SODIUM CHLORIDE 3 MILLILITER(S): 9 INJECTION INTRAMUSCULAR; INTRAVENOUS; SUBCUTANEOUS at 06:01

## 2021-11-09 RX ADMIN — Medication 120 MILLIGRAM(S): at 08:17

## 2021-11-09 RX ADMIN — Medication 4: at 13:52

## 2021-11-09 RX ADMIN — Medication 81 MILLIGRAM(S): at 08:21

## 2021-11-09 NOTE — PROGRESS NOTE ADULT - ASSESSMENT
75 year old male with history of DM, HTN, persistent Afib on Eliquis who is admitted for Sotalol loading and cardioversion    Continue Eliquis 5mg BID  Sotalol 120mg q12h x6 doses.  Patient has received 2/6 doses  EKG 2 hours after each dose of Sotalol to monitor QTc  If QTc >500ms hold Sotalol and contact EP for dose adjustment  Daily BMP and magnesium  Keep K>4, Mag > 2  Cardioversion on 11/11  NPO after MN on 11/11  Will follow

## 2021-11-09 NOTE — PROGRESS NOTE ADULT - SUBJECTIVE AND OBJECTIVE BOX
HPI:  75 year old male with history of DM, HLD, persistent atrial fibrillation who underwent cardioversion in June 2021 and had a recurrence of Afib approximately 4 hours after cardioversion. Patient is maintained on Eliquis and has not missed any doses. Patient with normal EF and non obstructive CAD on cardiac cath. Patient denies any CP, palpitations, SOB, dizziness, lightheadedness. Patient presents to [facility] for admission for Sotalol loading and cardioversion. (08 Nov 2021 16:25)    11/9/21: Patient admitted for Sotalol initiation.  Patient is s/p 2/6 doses.  Denies any CP, palpitations, SOB, dizizness, lightheadedness.  No events noted overnight  TELE: Afib 80s  EKG:    MEDICATIONS  (STANDING):  apixaban 5 milliGRAM(s) Oral two times a day  aspirin enteric coated 81 milliGRAM(s) Oral daily  atorvastatin 20 milliGRAM(s) Oral at bedtime  dextrose 40% Gel 15 Gram(s) Oral once  dextrose 5%. 1000 milliLiter(s) (50 mL/Hr) IV Continuous <Continuous>  dextrose 5%. 1000 milliLiter(s) (100 mL/Hr) IV Continuous <Continuous>  dextrose 50% Injectable 25 Gram(s) IV Push once  dextrose 50% Injectable 12.5 Gram(s) IV Push once  dextrose 50% Injectable 25 Gram(s) IV Push once  glucagon  Injectable 1 milliGRAM(s) IntraMuscular once  insulin lispro (ADMELOG) corrective regimen sliding scale   SubCutaneous three times a day before meals  sodium chloride 0.9% lock flush 3 milliLiter(s) IV Push every 8 hours  sotalol 120 milliGRAM(s) Oral every 12 hours    MEDICATIONS  (PRN):          Vital Signs Last 24 Hrs  T(C): 36.5 (09 Nov 2021 08:00), Max: 36.7 (08 Nov 2021 19:39)  T(F): 97.7 (09 Nov 2021 08:00), Max: 98 (08 Nov 2021 19:39)  HR: 82 (09 Nov 2021 08:00) (82 - 92)  BP: 141/89 (09 Nov 2021 08:00) (122/76 - 141/89)  BP(mean): --  RR: 18 (09 Nov 2021 08:00) (18 - 18)  SpO2: 99% (09 Nov 2021 08:00) (98% - 100%)    PHYSICAL EXAMINATION:  GENERAL: In no apparent distress, well nourished, and hydrated.  NECK: Supple and normal thyroid.  No JVD or carotid bruit.  Carotid pulse is 2+ bilaterally.  HEART: Irregular No murmurs, rubs, or gallops.  PULMONARY: Clear to auscultation and perfusion.  No rales, wheezing, or rhonchi bilaterally.  ABDOMEN: Soft, Nontender, Nondistended; Bowel sounds present  EXTREMITIES:  2+ Peripheral Pulses, No clubbing, cyanosis, or edema  LYMPH: No lymphadenopathy noted  NEUROLOGICAL: Grossly nonfocal    11-09    139  |  107  |  19  ----------------------------<  247<H>  4.5   |  26  |  0.96    Ca    8.7      09 Nov 2021 07:17  Mg     2.0     11-09         HPI:  75 year old male with history of DM, HLD, persistent atrial fibrillation who underwent cardioversion in 2021 and had a recurrence of Afib approximately 4 hours after cardioversion. Patient is maintained on Eliquis and has not missed any doses. Patient with normal EF and non obstructive CAD on cardiac cath. Patient denies any CP, palpitations, SOB, dizziness, lightheadedness. Patient presents to [facility] for admission for Sotalol loading and cardioversion. (2021 16:25)    21: Patient admitted for Sotalol initiation.  Patient is s/p 2/6 doses.  Denies any CP, palpitations, SOB, dizizness, lightheadedness.  No events noted overnight  TELE: Afib 80s  EK21@09:58  Afib@88bpm  QRS: 78ms  QT/QTc: 376/454ms    MEDICATIONS  (STANDING):  apixaban 5 milliGRAM(s) Oral two times a day  aspirin enteric coated 81 milliGRAM(s) Oral daily  atorvastatin 20 milliGRAM(s) Oral at bedtime  dextrose 40% Gel 15 Gram(s) Oral once  dextrose 5%. 1000 milliLiter(s) (50 mL/Hr) IV Continuous <Continuous>  dextrose 5%. 1000 milliLiter(s) (100 mL/Hr) IV Continuous <Continuous>  dextrose 50% Injectable 25 Gram(s) IV Push once  dextrose 50% Injectable 12.5 Gram(s) IV Push once  dextrose 50% Injectable 25 Gram(s) IV Push once  glucagon  Injectable 1 milliGRAM(s) IntraMuscular once  insulin lispro (ADMELOG) corrective regimen sliding scale   SubCutaneous three times a day before meals  sodium chloride 0.9% lock flush 3 milliLiter(s) IV Push every 8 hours  sotalol 120 milliGRAM(s) Oral every 12 hours    MEDICATIONS  (PRN):          Vital Signs Last 24 Hrs  T(C): 36.5 (2021 08:00), Max: 36.7 (2021 19:39)  T(F): 97.7 (2021 08:00), Max: 98 (2021 19:39)  HR: 82 (2021 08:00) (82 - 92)  BP: 141/89 (2021 08:00) (122/76 - 141/89)  BP(mean): --  RR: 18 (2021 08:00) (18 - 18)  SpO2: 99% (2021 08:00) (98% - 100%)    PHYSICAL EXAMINATION:  GENERAL: In no apparent distress, well nourished, and hydrated.  NECK: Supple and normal thyroid.  No JVD or carotid bruit.  Carotid pulse is 2+ bilaterally.  HEART: Irregular No murmurs, rubs, or gallops.  PULMONARY: Clear to auscultation and perfusion.  No rales, wheezing, or rhonchi bilaterally.  ABDOMEN: Soft, Nontender, Nondistended; Bowel sounds present  EXTREMITIES:  2+ Peripheral Pulses, No clubbing, cyanosis, or edema  LYMPH: No lymphadenopathy noted  NEUROLOGICAL: Grossly nonfocal        139  |  107  |  19  ----------------------------<  247<H>  4.5   |  26  |  0.96    Ca    8.7      2021 07:17  Mg     2.0

## 2021-11-10 LAB
ANION GAP SERPL CALC-SCNC: 4 MMOL/L — LOW (ref 5–17)
BUN SERPL-MCNC: 18 MG/DL — SIGNIFICANT CHANGE UP (ref 7–23)
CALCIUM SERPL-MCNC: 8.8 MG/DL — SIGNIFICANT CHANGE UP (ref 8.5–10.1)
CHLORIDE SERPL-SCNC: 106 MMOL/L — SIGNIFICANT CHANGE UP (ref 96–108)
CO2 SERPL-SCNC: 27 MMOL/L — SIGNIFICANT CHANGE UP (ref 22–31)
CREAT SERPL-MCNC: 0.87 MG/DL — SIGNIFICANT CHANGE UP (ref 0.5–1.3)
GLUCOSE SERPL-MCNC: 243 MG/DL — HIGH (ref 70–99)
MAGNESIUM SERPL-MCNC: 2.1 MG/DL — SIGNIFICANT CHANGE UP (ref 1.6–2.6)
POTASSIUM SERPL-MCNC: 4.5 MMOL/L — SIGNIFICANT CHANGE UP (ref 3.5–5.3)
POTASSIUM SERPL-SCNC: 4.5 MMOL/L — SIGNIFICANT CHANGE UP (ref 3.5–5.3)
SARS-COV-2 RNA SPEC QL NAA+PROBE: SIGNIFICANT CHANGE UP
SODIUM SERPL-SCNC: 137 MMOL/L — SIGNIFICANT CHANGE UP (ref 135–145)

## 2021-11-10 PROCEDURE — 93010 ELECTROCARDIOGRAM REPORT: CPT | Mod: 76

## 2021-11-10 RX ORDER — SOTALOL HCL 120 MG
1 TABLET ORAL
Qty: 60 | Refills: 0
Start: 2021-11-10 | End: 2021-12-09

## 2021-11-10 RX ADMIN — Medication 6: at 12:15

## 2021-11-10 RX ADMIN — APIXABAN 5 MILLIGRAM(S): 2.5 TABLET, FILM COATED ORAL at 10:36

## 2021-11-10 RX ADMIN — Medication 120 MILLIGRAM(S): at 08:27

## 2021-11-10 RX ADMIN — ATORVASTATIN CALCIUM 20 MILLIGRAM(S): 80 TABLET, FILM COATED ORAL at 22:57

## 2021-11-10 RX ADMIN — INSULIN GLARGINE 8 UNIT(S): 100 INJECTION, SOLUTION SUBCUTANEOUS at 22:57

## 2021-11-10 RX ADMIN — APIXABAN 5 MILLIGRAM(S): 2.5 TABLET, FILM COATED ORAL at 22:57

## 2021-11-10 RX ADMIN — Medication 1: at 22:57

## 2021-11-10 RX ADMIN — SODIUM CHLORIDE 3 MILLILITER(S): 9 INJECTION INTRAMUSCULAR; INTRAVENOUS; SUBCUTANEOUS at 05:12

## 2021-11-10 RX ADMIN — Medication 81 MILLIGRAM(S): at 10:36

## 2021-11-10 RX ADMIN — SODIUM CHLORIDE 3 MILLILITER(S): 9 INJECTION INTRAMUSCULAR; INTRAVENOUS; SUBCUTANEOUS at 22:58

## 2021-11-10 RX ADMIN — Medication 4: at 07:56

## 2021-11-10 RX ADMIN — Medication 120 MILLIGRAM(S): at 21:29

## 2021-11-10 NOTE — PROGRESS NOTE ADULT - SUBJECTIVE AND OBJECTIVE BOX
HPI:  75 year old male with history of DM, HLD, persistent atrial fibrillation who underwent cardioversion in June 2021 and had a recurrence of Afib approximately 4 hours after cardioversion. Patient is maintained on Eliquis and has not missed any doses. Patient with normal EF and non obstructive CAD on cardiac cath. Patient denies any CP, palpitations, SOB, dizziness, lightheadedness. Patient presents to [facility] for admission for Sotalol loading and cardioversion. (08 Nov 2021 16:25)    11/10/21:     MEDICATIONS  (STANDING):  apixaban 5 milliGRAM(s) Oral two times a day  aspirin enteric coated 81 milliGRAM(s) Oral daily  atorvastatin 20 milliGRAM(s) Oral at bedtime  dextrose 40% Gel 15 Gram(s) Oral once  dextrose 5%. 1000 milliLiter(s) (50 mL/Hr) IV Continuous <Continuous>  dextrose 5%. 1000 milliLiter(s) (100 mL/Hr) IV Continuous <Continuous>  dextrose 50% Injectable 25 Gram(s) IV Push once  dextrose 50% Injectable 12.5 Gram(s) IV Push once  dextrose 50% Injectable 25 Gram(s) IV Push once  glucagon  Injectable 1 milliGRAM(s) IntraMuscular once  insulin glargine Injectable (LANTUS) 8 Unit(s) SubCutaneous at bedtime  insulin lispro (ADMELOG) corrective regimen sliding scale   SubCutaneous at bedtime  insulin lispro (ADMELOG) corrective regimen sliding scale   SubCutaneous three times a day before meals  sodium chloride 0.9% lock flush 3 milliLiter(s) IV Push every 8 hours  sotalol 120 milliGRAM(s) Oral every 12 hours    MEDICATIONS  (PRN):    ICU Vital Signs Last 24 Hrs  T(C): 36.4 (10 Nov 2021 07:12), Max: 36.4 (10 Nov 2021 07:12)  T(F): 97.5 (10 Nov 2021 07:12), Max: 97.5 (10 Nov 2021 07:12)  HR: 87 (10 Nov 2021 07:12) (84 - 90)  BP: 129/81 (10 Nov 2021 07:12) (114/62 - 139/72)  BP(mean): --  ABP: --  ABP(mean): --  RR: 18 (10 Nov 2021 07:12) (16 - 18)  SpO2: 99% (10 Nov 2021 07:12) (98% - 99%)        PHYSICAL EXAMINATION:  GENERAL: In no apparent distress, well nourished, and hydrated.  NECK: Supple and normal thyroid.  No JVD or carotid bruit.  Carotid pulse is 2+ bilaterally.  HEART: Irregular No murmurs, rubs, or gallops.  PULMONARY: Clear to auscultation and perfusion.  No rales, wheezing, or rhonchi bilaterally.  ABDOMEN: Soft, Nontender, Nondistended; Bowel sounds present  EXTREMITIES:  2+ Peripheral Pulses, No clubbing, cyanosis, or edema  LYMPH: No lymphadenopathy noted  NEUROLOGICAL: Grossly nonfocal    11-10    137  |  106  |  18  ----------------------------<  243<H>  4.5   |  27  |  0.87    Ca    8.8      10 Nov 2021 07:44  Mg     2.1     11-10         HPI:  75 year old male with history of DM, HLD, persistent atrial fibrillation who underwent cardioversion in 2021 and had a recurrence of Afib approximately 4 hours after cardioversion. Patient is maintained on Eliquis and has not missed any doses. Patient with normal EF and non obstructive CAD on cardiac cath. Patient denies any CP, palpitations, SOB, dizziness, lightheadedness. Patient presents to [facility] for admission for Sotalol loading and cardioversion. (2021 16:25)    11/10/21: Patient tolerating Sotalol well, no events noted overnight.  TELE:  Afib 80s  EK/10/21@10:37  Afib@82bpm  QRS: 76ms  QT/QTc: 372/434ms    MEDICATIONS  (STANDING):  apixaban 5 milliGRAM(s) Oral two times a day  aspirin enteric coated 81 milliGRAM(s) Oral daily  atorvastatin 20 milliGRAM(s) Oral at bedtime  dextrose 40% Gel 15 Gram(s) Oral once  dextrose 5%. 1000 milliLiter(s) (50 mL/Hr) IV Continuous <Continuous>  dextrose 5%. 1000 milliLiter(s) (100 mL/Hr) IV Continuous <Continuous>  dextrose 50% Injectable 25 Gram(s) IV Push once  dextrose 50% Injectable 12.5 Gram(s) IV Push once  dextrose 50% Injectable 25 Gram(s) IV Push once  glucagon  Injectable 1 milliGRAM(s) IntraMuscular once  insulin glargine Injectable (LANTUS) 8 Unit(s) SubCutaneous at bedtime  insulin lispro (ADMELOG) corrective regimen sliding scale   SubCutaneous at bedtime  insulin lispro (ADMELOG) corrective regimen sliding scale   SubCutaneous three times a day before meals  sodium chloride 0.9% lock flush 3 milliLiter(s) IV Push every 8 hours  sotalol 120 milliGRAM(s) Oral every 12 hours    MEDICATIONS  (PRN):    ICU Vital Signs Last 24 Hrs  T(C): 36.4 (10 Nov 2021 07:12), Max: 36.4 (10 Nov 2021 07:12)  T(F): 97.5 (10 Nov 2021 07:12), Max: 97.5 (10 Nov 2021 07:12)  HR: 87 (10 Nov 2021 07:12) (84 - 90)  BP: 129/81 (10 Nov 2021 07:12) (114/62 - 139/72)  BP(mean): --  ABP: --  ABP(mean): --  RR: 18 (10 Nov 2021 07:12) (16 - 18)  SpO2: 99% (10 Nov 2021 07:12) (98% - 99%)        PHYSICAL EXAMINATION:  GENERAL: In no apparent distress, well nourished, and hydrated.  NECK: Supple and normal thyroid.  No JVD or carotid bruit.  Carotid pulse is 2+ bilaterally.  HEART: Irregular No murmurs, rubs, or gallops.  PULMONARY: Clear to auscultation and perfusion.  No rales, wheezing, or rhonchi bilaterally.  ABDOMEN: Soft, Nontender, Nondistended; Bowel sounds present  EXTREMITIES:  2+ Peripheral Pulses, No clubbing, cyanosis, or edema  LYMPH: No lymphadenopathy noted  NEUROLOGICAL: Grossly nonfocal    11-10    137  |  106  |  18  ----------------------------<  243<H>  4.5   |  27  |  0.87    Ca    8.8      10 Nov 2021 07:44  Mg     2.1     11-10

## 2021-11-10 NOTE — PROGRESS NOTE ADULT - ASSESSMENT
75 year old male with history of DM, HTN, persistent Afib on Eliquis who is admitted for Sotalol loading and cardioversion    Continue Eliquis 5mg BID  Sotalol 120mg q12h x6 doses.  Patient has received 2/6 doses  EKG 2 hours after each dose of Sotalol to monitor QTc  If QTc >500ms hold Sotalol and contact EP for dose adjustment  Daily BMP and magnesium  Keep K>4, Mag > 2  Cardioversion on 11/11  NPO after MN on 11/11  Will follow      75 year old male with history of DM, HTN, persistent Afib on Eliquis who is admitted for Sotalol loading and cardioversion    Continue Eliquis 5mg BID  Continue Sotalol 120mg q12h x6 doses.  Patient has received  doses  EKG 2 hours after each dose of Sotalol to monitor QTc  If QTc >500ms hold Sotalol and contact EP for dose adjustment  Daily BMP and magnesium  Keep K>4, Mag > 2  Cardioversion on 11/11  NPO after MN on 11/11  Will follow      75 year old male with history of DM, HTN, persistent Afib on Eliquis who is admitted for Sotalol loading and cardioversion    Continue Eliquis 5mg BID  Continue Sotalol 120mg q12h x6 doses.  Patient has received 4/6 doses  EKG 2 hours after each dose of Sotalol to monitor QTc  If QTc >500ms hold Sotalol and contact EP for dose adjustment  Will send Sotalol prescription to retail pharmacy for discharge and mail away  Daily BMP and magnesium  Keep K>4, Mag > 2  Cardioversion on 11/11  NPO after MN on 11/11

## 2021-11-11 ENCOUNTER — FORM ENCOUNTER (OUTPATIENT)
Age: 76
End: 2021-11-11

## 2021-11-11 ENCOUNTER — TRANSCRIPTION ENCOUNTER (OUTPATIENT)
Age: 76
End: 2021-11-11

## 2021-11-11 VITALS
SYSTOLIC BLOOD PRESSURE: 104 MMHG | RESPIRATION RATE: 16 BRPM | OXYGEN SATURATION: 98 % | DIASTOLIC BLOOD PRESSURE: 65 MMHG | HEART RATE: 58 BPM

## 2021-11-11 LAB
ADD ON TEST-SPECIMEN IN LAB: SIGNIFICANT CHANGE UP
ANION GAP SERPL CALC-SCNC: 5 MMOL/L — SIGNIFICANT CHANGE UP (ref 5–17)
BUN SERPL-MCNC: 20 MG/DL — SIGNIFICANT CHANGE UP (ref 7–23)
CALCIUM SERPL-MCNC: 8.6 MG/DL — SIGNIFICANT CHANGE UP (ref 8.5–10.1)
CHLORIDE SERPL-SCNC: 106 MMOL/L — SIGNIFICANT CHANGE UP (ref 96–108)
CO2 SERPL-SCNC: 26 MMOL/L — SIGNIFICANT CHANGE UP (ref 22–31)
CREAT SERPL-MCNC: 0.87 MG/DL — SIGNIFICANT CHANGE UP (ref 0.5–1.3)
GLUCOSE SERPL-MCNC: 236 MG/DL — HIGH (ref 70–99)
MAGNESIUM SERPL-MCNC: 2.1 MG/DL — SIGNIFICANT CHANGE UP (ref 1.6–2.6)
POTASSIUM SERPL-MCNC: 4.5 MMOL/L — SIGNIFICANT CHANGE UP (ref 3.5–5.3)
POTASSIUM SERPL-SCNC: 4.5 MMOL/L — SIGNIFICANT CHANGE UP (ref 3.5–5.3)
SODIUM SERPL-SCNC: 137 MMOL/L — SIGNIFICANT CHANGE UP (ref 135–145)

## 2021-11-11 PROCEDURE — 92960 CARDIOVERSION ELECTRIC EXT: CPT

## 2021-11-11 PROCEDURE — 93010 ELECTROCARDIOGRAM REPORT: CPT | Mod: 76

## 2021-11-11 RX ORDER — SOTALOL HCL 120 MG
1 TABLET ORAL
Qty: 60 | Refills: 0
Start: 2021-11-11

## 2021-11-11 RX ORDER — SOTALOL HCL 120 MG
1 TABLET ORAL
Qty: 0 | Refills: 0 | DISCHARGE
Start: 2021-11-11

## 2021-11-11 RX ADMIN — Medication 4: at 12:38

## 2021-11-11 RX ADMIN — SODIUM CHLORIDE 3 MILLILITER(S): 9 INJECTION INTRAMUSCULAR; INTRAVENOUS; SUBCUTANEOUS at 05:48

## 2021-11-11 RX ADMIN — Medication 81 MILLIGRAM(S): at 09:35

## 2021-11-11 RX ADMIN — APIXABAN 5 MILLIGRAM(S): 2.5 TABLET, FILM COATED ORAL at 09:35

## 2021-11-11 RX ADMIN — Medication 120 MILLIGRAM(S): at 08:00

## 2021-11-11 NOTE — DISCHARGE NOTE PROVIDER - HOSPITAL COURSE
HPI:  75 year old male with history of DM, HLD, persistent atrial fibrillation who underwent cardioversion in June 2021 and had a recurrence of Afib approximately 4 hours after cardioversion. Patient is maintained on Eliquis and has not missed any doses. Patient with normal EF and non obstructive CAD on cardiac cath. Patient denies any CP, palpitations, SOB, dizziness, lightheadedness. Patient presents to [facility] for admission for Sotalol loading and cardioversion. (08 Nov 2021 16:25)    Subjective:    EKG:  TELE:    MEDICATIONS  (STANDING):  apixaban 5 milliGRAM(s) Oral two times a day  aspirin enteric coated 81 milliGRAM(s) Oral daily  atorvastatin 20 milliGRAM(s) Oral at bedtime  dextrose 40% Gel 15 Gram(s) Oral once  dextrose 5%. 1000 milliLiter(s) (50 mL/Hr) IV Continuous <Continuous>  dextrose 5%. 1000 milliLiter(s) (100 mL/Hr) IV Continuous <Continuous>  dextrose 50% Injectable 25 Gram(s) IV Push once  dextrose 50% Injectable 12.5 Gram(s) IV Push once  dextrose 50% Injectable 25 Gram(s) IV Push once  glucagon  Injectable 1 milliGRAM(s) IntraMuscular once  insulin glargine Injectable (LANTUS) 8 Unit(s) SubCutaneous at bedtime  insulin lispro (ADMELOG) corrective regimen sliding scale   SubCutaneous at bedtime  insulin lispro (ADMELOG) corrective regimen sliding scale   SubCutaneous three times a day before meals  sodium chloride 0.9% lock flush 3 milliLiter(s) IV Push every 8 hours  sotalol 120 milliGRAM(s) Oral every 12 hours    MEDICATIONS  (PRN):      Allergies    No Known Allergies    Vital Signs Last 24 Hrs  T(C): 36.7 (11 Nov 2021 10:12), Max: 36.7 (11 Nov 2021 10:12)  T(F): 98.1 (11 Nov 2021 10:12), Max: 98.1 (11 Nov 2021 10:12)  HR: 80 (11 Nov 2021 10:12) (69 - 80)  BP: 130/82 (11 Nov 2021 10:12) (112/68 - 133/89)  BP(mean): --  RR: 16 (11 Nov 2021 10:12) (16 - 16)  SpO2: 99% (11 Nov 2021 10:12) (99% - 99%)    PHYSICAL EXAMINATION:  GENERAL: In no apparent distress, well nourished, and hydrated.  HEAD:  Atraumatic, Normocephalic  EYES: EOMI, PERRLA, conjunctiva and sclera clear  ENMT: No tonsillar erythema, exudates, or enlargement; Moist mucous membranes, Good dentition, No lesions  NECK: Supple and normal thyroid.  No JVD or carotid bruit.  Carotid pulse is 2+ bilaterally.  HEART: Regular rate and rhythm; No murmurs, rubs, or gallops.  PULMONARY: Clear to auscultation and perfusion.  No rales, wheezing, or rhonchi bilaterally.  ABDOMEN: Soft, Nontender, Nondistended; Bowel sounds present  EXTREMITIES:  2+ Peripheral Pulses, No clubbing, cyanosis, or edema  LYMPH: No lymphadenopathy noted  NEUROLOGICAL: Grossly nonfocal    LABS:                        13.7   6.18  )-----------( 151      ( 11 Nov 2021 07:05 )             40.9     11-11    137  |  106  |  20  ----------------------------<  236<H>  4.5   |  26  |  0.87    Ca    8.6      11 Nov 2021 07:04  Mg     2.1     11-11        ASSESSMENT & PLAN:  76 yo male with history of DM, HTN, persistent Afib on eliquis admitted for sotalol loading and cardiversion.    this morning after successful DCCV with normal sinus.     plan:  stable for discharge home   continue sotalol 120 bid, QTc in sinus rhythm stable at  continue oac with eliquis  outpatient follow up with Dr De León in 2-4weeks. HPI:  75 year old male with history of DM, HLD, persistent atrial fibrillation who underwent cardioversion in June 2021 and had a recurrence of Afib approximately 4 hours after cardioversion. Patient is maintained on Eliquis and has not missed any doses. Patient with normal EF and non obstructive CAD on cardiac cath. Patient denies any CP, palpitations, SOB, dizziness, lightheadedness. Patient presents to [facility] for admission for Sotalol loading and cardioversion. (08 Nov 2021 16:25)    Subjective: pt examined after cardioversion, reports feeling well, denies any symptoms of dizziness chest pain or sob.     EKG: post cardioversion sinus rhythm, 62bpm, [id number]  TELE: overnight afib rates 70-80bpm    MEDICATIONS  (STANDING):  apixaban 5 milliGRAM(s) Oral two times a day  aspirin enteric coated 81 milliGRAM(s) Oral daily  atorvastatin 20 milliGRAM(s) Oral at bedtime  dextrose 40% Gel 15 Gram(s) Oral once  dextrose 5%. 1000 milliLiter(s) (50 mL/Hr) IV Continuous <Continuous>  dextrose 5%. 1000 milliLiter(s) (100 mL/Hr) IV Continuous <Continuous>  dextrose 50% Injectable 25 Gram(s) IV Push once  dextrose 50% Injectable 12.5 Gram(s) IV Push once  dextrose 50% Injectable 25 Gram(s) IV Push once  glucagon  Injectable 1 milliGRAM(s) IntraMuscular once  insulin glargine Injectable (LANTUS) 8 Unit(s) SubCutaneous at bedtime  insulin lispro (ADMELOG) corrective regimen sliding scale   SubCutaneous at bedtime  insulin lispro (ADMELOG) corrective regimen sliding scale   SubCutaneous three times a day before meals  sodium chloride 0.9% lock flush 3 milliLiter(s) IV Push every 8 hours  sotalol 120 milliGRAM(s) Oral every 12 hours    MEDICATIONS  (PRN):      Allergies    No Known Allergies    Vital Signs Last 24 Hrs  T(C): 36.7 (11 Nov 2021 10:12), Max: 36.7 (11 Nov 2021 10:12)  T(F): 98.1 (11 Nov 2021 10:12), Max: 98.1 (11 Nov 2021 10:12)  HR: 80 (11 Nov 2021 10:12) (69 - 80)  BP: 130/82 (11 Nov 2021 10:12) (112/68 - 133/89)  BP(mean): --  RR: 16 (11 Nov 2021 10:12) (16 - 16)  SpO2: 99% (11 Nov 2021 10:12) (99% - 99%)    PHYSICAL EXAMINATION:  GENERAL: In no apparent distress, well nourished, and hydrated.  HEAD:  Atraumatic, Normocephalic  EYES: EOMI, PERRLA, conjunctiva and sclera clear  ENMT: No tonsillar erythema, exudates, or enlargement; Moist mucous membranes, Good dentition, No lesions  NECK: Supple and normal thyroid.  No JVD or carotid bruit.  Carotid pulse is 2+ bilaterally.  HEART: Regular rate and rhythm; No murmurs, rubs, or gallops.  PULMONARY: Clear to auscultation and perfusion.  No rales, wheezing, or rhonchi bilaterally.  ABDOMEN: Soft, Nontender, Nondistended; Bowel sounds present  EXTREMITIES:  2+ Peripheral Pulses, No clubbing, cyanosis, or edema  LYMPH: No lymphadenopathy noted  NEUROLOGICAL: Grossly nonfocal    LABS:                        13.7   6.18  )-----------( 151      ( 11 Nov 2021 07:05 )             40.9     11-11    137  |  106  |  20  ----------------------------<  236<H>  4.5   |  26  |  0.87    Ca    8.6      11 Nov 2021 07:04  Mg     2.1     11-11        ASSESSMENT & PLAN:  74 yo male with history of DM, HTN, persistent Afib on eliquis admitted for sotalol loading and cardiversion.    this morning after successful DCCV with normal sinus rhythm.     plan:  stable for discharge home   continue sotalol 120 bid, QTc in sinus rhythm stable at 432ms  continue oac with eliquis  metoprolol stopped  outpatient follow up with Dr De León in 2-4weeks.   discussed  plan with patient and verbalized understanding

## 2021-11-11 NOTE — DISCHARGE NOTE PROVIDER - PROVIDER TOKENS
PROVIDER:[TOKEN:[[id number]],SCHEDULEDAPPT:[11/22/2021],SCHEDULEDAPPTTIME:[02:30 PM],ESTABLISHEDPATIENT:[T]]

## 2021-11-11 NOTE — DISCHARGE NOTE NURSING/CASE MANAGEMENT/SOCIAL WORK - PATIENT PORTAL LINK FT
You can access the FollowMyHealth Patient Portal offered by [facility] by registering at the following website: [email or web address]. By joining [employer]’s FollowMyHealth portal, you will also be able to view your health information using other applications (apps) compatible with our system.

## 2021-11-11 NOTE — DISCHARGE NOTE PROVIDER - CARE PROVIDER_API CALL
Ciara De León)  Cardiac Electrophysiology; Cardiovascular Disease; Internal Medicine  [address] [place] [address]  Phone: [phone]  Fax: [phone]  Established Patient  Scheduled Appointment: 11/22/2021 02:30 PM

## 2021-11-11 NOTE — DISCHARGE NOTE PROVIDER - NSDCMRMEDTOKEN_GEN_ALL_CORE_FT
Eliquis 5 mg oral tablet: 1 tab(s) orally 2 times a day  Januvia 25 mg oral tablet: 1 tab(s) orally once a day  Low Dose ASA 81 mg oral tablet: 1 tab(s) orally once a day  metFORMIN 500 mg oral tablet, extended release: 2 tab(s) orally 2 times a day  rosuvastatin 5 mg oral tablet: 1 tab(s) orally once a day  sotalol 120 mg oral tablet: 1 tab(s) orally every 12 hours   Eliquis 5 mg oral tablet: 1 tab(s) orally 2 times a day  Januvia 25 mg oral tablet: 1 tab(s) orally once a day  Low Dose ASA 81 mg oral tablet: 1 tab(s) orally once a day  metFORMIN 500 mg oral tablet, extended release: 2 tab(s) orally 2 times a day  rosuvastatin 5 mg oral tablet: 1 tab(s) orally once a day  sotalol 120 mg oral tablet: 1 tab(s) orally every 12 hours  sotalol 120 mg oral tablet: 1 tab(s) orally 2 times a day

## 2021-11-11 NOTE — DISCHARGE NOTE PROVIDER - NSDCACTIVITY_GEN_ALL_CORE
Return to Work/School allowed/Bathing allowed/Do not drive or operate machinery/Do not make important decisions/Walking - Indoors allowed/Walking - Outdoors allowed

## 2021-11-11 NOTE — PROCEDURE NOTE - NSCOMPLICATION_GEN_A_CORE
pt. a&ox3. pt. denies pain or discomfort. pt. denies nausea/vomiting. awaiting bed. admitting RN did come downstairs for hallway spot but patient unable to ambulate alone. will continue to monitor. no complications

## 2021-11-11 NOTE — PROCEDURE NOTE - ADDITIONAL PROCEDURE DETAILS
continue sotalol 120mg bid eliquis 5mg bid   ECG shows SR QTC 450ms   mcot monitor for 1 weeks will get it in office  follow up after that in office   d/c home after 6 doses of sotalol

## 2021-11-12 ENCOUNTER — NON-APPOINTMENT (OUTPATIENT)
Age: 76
End: 2021-11-12

## 2021-11-15 ENCOUNTER — FORM ENCOUNTER (OUTPATIENT)
Age: 76
End: 2021-11-15

## 2021-11-15 DIAGNOSIS — I10 ESSENTIAL (PRIMARY) HYPERTENSION: ICD-10-CM

## 2021-11-15 DIAGNOSIS — I48.19 OTHER PERSISTENT ATRIAL FIBRILLATION: ICD-10-CM

## 2021-11-15 DIAGNOSIS — E11.9 TYPE 2 DIABETES MELLITUS WITHOUT COMPLICATIONS: ICD-10-CM

## 2021-11-15 DIAGNOSIS — Z79.01 LONG TERM (CURRENT) USE OF ANTICOAGULANTS: ICD-10-CM

## 2021-11-15 DIAGNOSIS — I25.10 ATHEROSCLEROTIC HEART DISEASE OF NATIVE CORONARY ARTERY WITHOUT ANGINA PECTORIS: ICD-10-CM

## 2021-11-22 ENCOUNTER — NON-APPOINTMENT (OUTPATIENT)
Age: 76
End: 2021-11-22

## 2021-11-22 ENCOUNTER — APPOINTMENT (OUTPATIENT)
Dept: ELECTROPHYSIOLOGY | Facility: CLINIC | Age: 76
End: 2021-11-22
Payer: MEDICARE

## 2021-11-22 VITALS
HEIGHT: 72 IN | BODY MASS INDEX: 28.44 KG/M2 | RESPIRATION RATE: 16 BRPM | SYSTOLIC BLOOD PRESSURE: 129 MMHG | WEIGHT: 210 LBS | HEART RATE: 97 BPM | OXYGEN SATURATION: 97 % | DIASTOLIC BLOOD PRESSURE: 84 MMHG

## 2021-11-22 PROCEDURE — 99214 OFFICE O/P EST MOD 30 MIN: CPT

## 2021-11-22 PROCEDURE — 93000 ELECTROCARDIOGRAM COMPLETE: CPT

## 2021-11-22 RX ORDER — METOPROLOL SUCCINATE 25 MG/1
25 TABLET, EXTENDED RELEASE ORAL
Qty: 90 | Refills: 1 | Status: DISCONTINUED | COMMUNITY
Start: 2021-05-12 | End: 2021-11-22

## 2021-11-22 NOTE — DISCUSSION/SUMMARY
[FreeTextEntry1] : 75 year old male with history of DM, HLD, persistent atrial fibrillation who underwent cardioversion in June 2021 and had a recurrence of Afib approximately 4 hours after cardioversion. Patient is maintained on Eliquis and has not missed any doses. Patient with normal EF and non obstructive CAD on cardiac cath from 8/2021. He is s/p Sotalol load on 120mg bid and DCCV on 11/11/21. \par -persistent af drug refractory cont eliquis 5mg bid, sotalol 120mg bid\par Rhythm control strategy is beneficial for this patient, should consider catheter ablation procedure as add on intervention with success to maintain SR ~ 50%. Discussed i/r/b/a of each treatment option and all questions were answered. Patient participated in shared decision discussion and prefers to undergo AF ablation procedure. \par -Pt had prior intubation with some difficulty due to spasms, will notify the anesthesiologist and may need steroids before intubation\par -encouraged lifestyle modification exercise, weight loss, reduce alcohol intake \par -Cardiac CT\par -continue uninterrupted anticoagulation hold eliquis morning of ablation procedure \par -Hold DM medications on the morning of procedure day\par -control DM\par -encouraged aerobic exercise, weight loss, alcohol abstinence \par \par Total length of time spent with this patient was 45 minutes and more then half of the time was spent face to face with the patient as well as counseling and coordination of care as stated above.\par

## 2021-11-22 NOTE — HISTORY OF PRESENT ILLNESS
[FreeTextEntry1] : 75 year old male with history of DM, HLD, persistent atrial fibrillation who underwent cardioversion in June 2021 and had a recurrence of Afib approximately 4 hours after cardioversion. Patient is maintained on Eliquis and has not missed any doses. Patient with normal EF and non obstructive CAD on cardiac cath from 8/2021. He is s/p Sotalol load on 120mg bid and DCCV on 11/11/21. He believes he stayed in SR for few days and he felt well then on Sunday he started feeling palpitations. \par Patient denies any CP, palpitations, SOB, dizziness, lightheadedness.\par ECG 11.22.21 Atrial fibrillation QTc stable 420ms

## 2021-12-02 ENCOUNTER — NON-APPOINTMENT (OUTPATIENT)
Age: 76
End: 2021-12-02

## 2021-12-06 RX ORDER — SOTALOL HYDROCHLORIDE TABLES AF 120 MG/1
120 TABLET ORAL
Refills: 0 | Status: DISCONTINUED | COMMUNITY
End: 2021-12-06

## 2021-12-07 ENCOUNTER — FORM ENCOUNTER (OUTPATIENT)
Age: 76
End: 2021-12-07

## 2022-01-31 ENCOUNTER — OUTPATIENT (OUTPATIENT)
Dept: OUTPATIENT SERVICES | Facility: HOSPITAL | Age: 77
LOS: 1 days | End: 2022-01-31
Payer: MEDICARE

## 2022-01-31 VITALS
TEMPERATURE: 98 F | DIASTOLIC BLOOD PRESSURE: 72 MMHG | HEIGHT: 70 IN | OXYGEN SATURATION: 97 % | WEIGHT: 214.73 LBS | SYSTOLIC BLOOD PRESSURE: 110 MMHG | HEART RATE: 94 BPM | RESPIRATION RATE: 20 BRPM

## 2022-01-31 DIAGNOSIS — I48.91 UNSPECIFIED ATRIAL FIBRILLATION: ICD-10-CM

## 2022-01-31 DIAGNOSIS — Z98.890 OTHER SPECIFIED POSTPROCEDURAL STATES: Chronic | ICD-10-CM

## 2022-01-31 DIAGNOSIS — Z29.9 ENCOUNTER FOR PROPHYLACTIC MEASURES, UNSPECIFIED: ICD-10-CM

## 2022-01-31 DIAGNOSIS — I48.19 OTHER PERSISTENT ATRIAL FIBRILLATION: ICD-10-CM

## 2022-01-31 DIAGNOSIS — Z01.818 ENCOUNTER FOR OTHER PREPROCEDURAL EXAMINATION: ICD-10-CM

## 2022-01-31 LAB
A1C WITH ESTIMATED AVERAGE GLUCOSE RESULT: 11 % — HIGH (ref 4–5.6)
ALBUMIN SERPL ELPH-MCNC: 4.5 G/DL — SIGNIFICANT CHANGE UP (ref 3.3–5.2)
ALP SERPL-CCNC: 53 U/L — SIGNIFICANT CHANGE UP (ref 40–120)
ALT FLD-CCNC: 16 U/L — SIGNIFICANT CHANGE UP
ANION GAP SERPL CALC-SCNC: 14 MMOL/L — SIGNIFICANT CHANGE UP (ref 5–17)
APTT BLD: 34.4 SEC — SIGNIFICANT CHANGE UP (ref 27.5–35.5)
AST SERPL-CCNC: 17 U/L — SIGNIFICANT CHANGE UP
BASOPHILS # BLD AUTO: 0.05 K/UL — SIGNIFICANT CHANGE UP (ref 0–0.2)
BASOPHILS NFR BLD AUTO: 0.7 % — SIGNIFICANT CHANGE UP (ref 0–2)
BILIRUB SERPL-MCNC: 0.6 MG/DL — SIGNIFICANT CHANGE UP (ref 0.4–2)
BLD GP AB SCN SERPL QL: SIGNIFICANT CHANGE UP
BUN SERPL-MCNC: 19.9 MG/DL — SIGNIFICANT CHANGE UP (ref 8–20)
CALCIUM SERPL-MCNC: 9.5 MG/DL — SIGNIFICANT CHANGE UP (ref 8.6–10.2)
CHLORIDE SERPL-SCNC: 98 MMOL/L — SIGNIFICANT CHANGE UP (ref 98–107)
CO2 SERPL-SCNC: 23 MMOL/L — SIGNIFICANT CHANGE UP (ref 22–29)
CREAT SERPL-MCNC: 0.73 MG/DL — SIGNIFICANT CHANGE UP (ref 0.5–1.3)
EOSINOPHIL # BLD AUTO: 0.17 K/UL — SIGNIFICANT CHANGE UP (ref 0–0.5)
EOSINOPHIL NFR BLD AUTO: 2.5 % — SIGNIFICANT CHANGE UP (ref 0–6)
ESTIMATED AVERAGE GLUCOSE: 269 MG/DL — HIGH (ref 68–114)
GLUCOSE SERPL-MCNC: 337 MG/DL — HIGH (ref 70–99)
HCT VFR BLD CALC: 45 % — SIGNIFICANT CHANGE UP (ref 39–50)
HGB BLD-MCNC: 14.7 G/DL — SIGNIFICANT CHANGE UP (ref 13–17)
IMM GRANULOCYTES NFR BLD AUTO: 0.3 % — SIGNIFICANT CHANGE UP (ref 0–1.5)
INR BLD: 1.25 RATIO — HIGH (ref 0.88–1.16)
LYMPHOCYTES # BLD AUTO: 1.73 K/UL — SIGNIFICANT CHANGE UP (ref 1–3.3)
LYMPHOCYTES # BLD AUTO: 25.7 % — SIGNIFICANT CHANGE UP (ref 13–44)
MAGNESIUM SERPL-MCNC: 1.9 MG/DL — SIGNIFICANT CHANGE UP (ref 1.6–2.6)
MCHC RBC-ENTMCNC: 31.6 PG — SIGNIFICANT CHANGE UP (ref 27–34)
MCHC RBC-ENTMCNC: 32.7 GM/DL — SIGNIFICANT CHANGE UP (ref 32–36)
MCV RBC AUTO: 96.8 FL — SIGNIFICANT CHANGE UP (ref 80–100)
MONOCYTES # BLD AUTO: 0.58 K/UL — SIGNIFICANT CHANGE UP (ref 0–0.9)
MONOCYTES NFR BLD AUTO: 8.6 % — SIGNIFICANT CHANGE UP (ref 2–14)
NEUTROPHILS # BLD AUTO: 4.18 K/UL — SIGNIFICANT CHANGE UP (ref 1.8–7.4)
NEUTROPHILS NFR BLD AUTO: 62.2 % — SIGNIFICANT CHANGE UP (ref 43–77)
PLATELET # BLD AUTO: 195 K/UL — SIGNIFICANT CHANGE UP (ref 150–400)
POTASSIUM SERPL-MCNC: 4.7 MMOL/L — SIGNIFICANT CHANGE UP (ref 3.5–5.3)
POTASSIUM SERPL-SCNC: 4.7 MMOL/L — SIGNIFICANT CHANGE UP (ref 3.5–5.3)
PROT SERPL-MCNC: 7.4 G/DL — SIGNIFICANT CHANGE UP (ref 6.6–8.7)
PROTHROM AB SERPL-ACNC: 14.4 SEC — HIGH (ref 10.6–13.6)
RBC # BLD: 4.65 M/UL — SIGNIFICANT CHANGE UP (ref 4.2–5.8)
RBC # FLD: 12.7 % — SIGNIFICANT CHANGE UP (ref 10.3–14.5)
SODIUM SERPL-SCNC: 135 MMOL/L — SIGNIFICANT CHANGE UP (ref 135–145)
WBC # BLD: 6.73 K/UL — SIGNIFICANT CHANGE UP (ref 3.8–10.5)
WBC # FLD AUTO: 6.73 K/UL — SIGNIFICANT CHANGE UP (ref 3.8–10.5)

## 2022-01-31 PROCEDURE — G0463: CPT

## 2022-01-31 PROCEDURE — 93010 ELECTROCARDIOGRAM REPORT: CPT

## 2022-01-31 PROCEDURE — 93005 ELECTROCARDIOGRAM TRACING: CPT

## 2022-01-31 NOTE — H&P PST ADULT - NSICDXFAMILYHX_GEN_ALL_CORE_FT
FAMILY HISTORY:  Father  Still living? Unknown  FH: heart attack, Age at diagnosis: 31-40    Grandparent  Still living? Unknown  FH: diabetes mellitus, Age at diagnosis: Age Unknown  FH: leukemia, Age at diagnosis: Age Unknown

## 2022-01-31 NOTE — H&P PST ADULT - MALLAMPATI CLASS
patient reports Hx of difficult intubation/Class II - visualization of the soft palate, fauces, and uvula patient reports dysphagia in the past and Hx of difficult intubation/Class IV (difficult) - the soft palate is not visible at all

## 2022-01-31 NOTE — H&P PST ADULT - NSICDXPASTMEDICALHX_GEN_ALL_CORE_FT
PAST MEDICAL HISTORY:  DM (diabetes mellitus)     HTN (hypertension)     Hyperlipidemia     Unspecified atrial fibrillation

## 2022-01-31 NOTE — H&P PST ADULT - PROBLEM SELECTOR PLAN 1
caprini score risk for dvt, surgical team to assess for dvt prophylaxis caprini score 6, moderate risk for dvt, surgical team to assess for dvt prophylaxis

## 2022-01-31 NOTE — H&P PST ADULT - HISTORY OF PRESENT ILLNESS
75 yo M PMH of DM2, HLD, persistent atrial fibrillation who underwent cardioversion in June 2021 and had a recurrence of Afib approximately 4 hours after cardioversion. Patient is maintained on Eliquis and has not missed any doses. Patient with normal EF and non obstructive CAD on cardiac cath from 8/2021. He is s/p Sotalol load on 120 mg bid and DCCV on 11/11/21. He believes he stayed in sinus rhythm for few days and he felt well then he started feeling palpitations. Patient denies any CP, palpitations, SOB, dizziness, lightheadedness. Patient now presents in anticipation of elective radiofrequency ablation of atrial fibrillation w/Dr De León scheduled for 2/9/2022      Cardiac summary:  Echocardiogram (date):   Stress Test (date):  Cardiac CT or MRI (date):   Cardiac Cath 8/2021: normal EF and non obstructive CAD  Cardiac surgery (date):   ECG 11/22/2021: Atrial fibrillation QTc stable 420ms    75 yo M PMH of DM2, HLD, persistent atrial fibrillation who underwent cardioversion in June 2021 and had a recurrence of Afib approximately 4 hours after cardioversion. Patient is maintained on Eliquis and has not missed any doses. Patient with normal EF and non obstructive CAD on cardiac cath from 8/2021. He is s/p Sotalol load on 120 mg bid and DCCV on 11/11/21. He believes he stayed in sinus rhythm for few days and he felt well then he started feeling palpitations. Today patient reports feeling intermittent palpitations, WILKES, occasional dizziness and lightheadedness. He denies chest pain or syncope. Patient now presents in anticipation of elective radiofrequency ablation of atrial fibrillation w/Dr De León scheduled for 2/9/2022    Cardiac summary:    Echocardiogram (date):   Stress Test (date):  Cardiac CT or MRI (date):   Cardiac Cath 8/2021: normal EF and non obstructive CAD  Cardiac surgery: denies  ECG 11/22/2021: Atrial fibrillation QTc stable 420 ms    77 yo M PMH of DM2, HLD, persistent atrial fibrillation who underwent cardioversion in June 2021 and had a recurrence of Afib approximately 4 hours after cardioversion, he has been maintained on Eliquis and has not missed any doses. Patient with normal EF and non obstructive CAD on cardiac cath from 8/2021. He is s/p Sotalol load on 120 mg bid and DCCV on 11/11/21. He believes he was in sinus rhythm for few days and he felt well then he started feeling palpitations. Today patient reports feeling intermittent palpitations, WILKES, occasional dizziness and lightheadedness. He denies chest pain or syncope. Patient now presents in anticipation of elective radiofrequency ablation of atrial fibrillation w/Dr De León scheduled for 2/9/2022    Cardiac summary:    Echocardiogram (date):   Stress Test (date):  Cardiac CT or MRI (date):   Cardiac Cath 8/2021: normal EF and non obstructive CAD  Cardiac surgery: denies  ECG 11/22/2021: Atrial fibrillation QTc stable 420 ms    77 yo M PMH of DM2, HLD, persistent atrial fibrillation who underwent cardioversion in June 2021 and had a recurrence of Afib approximately 4 hours after cardioversion, he has been maintained on Eliquis and has not missed any doses. Patient with normal EF and non obstructive CAD on cardiac cath from 8/2021. He is s/p Sotalol load on 120 mg bid and DCCV on 11/11/21. He believes he was in sinus rhythm for few days and he felt well then he started feeling palpitations. Today patient reports feeling intermittent palpitations, WILKES, occasional dizziness and lightheadedness. He denies chest pain or syncope. Patient now presents in anticipation of elective radiofrequency ablation of atrial fibrillation w/Dr De León scheduled for 2/9/2022    Cardiac summary:    Echocardiogram 8/4/2021: LVEF 55-60%, normal global systolic function  Stress Test 8/3/2021: stress LV global function is normal  Cardiac CT: scheduled   Cardiac Cath 8/2021:    - Non-Obstructive CAD.   - Normal left ventricular systolic function without segmental wall motion abnormalities.   - Estimated LV ejection fraction is 55-60 %.   - Normal left ventricular end-diastolic pressure 10 mm Hg.  Cardiac surgery: denies  ECG 11/22/2021: Atrial fibrillation QTc stable 420 ms

## 2022-01-31 NOTE — H&P PST ADULT - LAB RESULTS AND INTERPRETATION
1/31/2022: abnormal labs reported to Dr De León, called in and faxed to PCP Dr Cowan. Radha Rodriguez NP

## 2022-01-31 NOTE — H&P PST ADULT - EKG AND INTERPRETATION
HR 94, afib w/PVCs, inferior infarct age undetermined, no acute change from ECG 11/10/2021, official reading pending

## 2022-01-31 NOTE — H&P PST ADULT - ASSESSMENT
75 yo M PMH of DM2, HLD, persistent atrial fibrillation who underwent cardioversion in June 2021 and had a recurrence of Afib approximately 4 hours after cardioversion. Patient is maintained on Eliquis and has not missed any doses. Patient with normal EF and non obstructive CAD on cardiac cath from 8/2021. He is s/p Sotalol load on 120 mg bid and DCCV on 11/11/21. He believes he stayed in sinus rhythm for few days and he felt well then he started feeling palpitations. Patient denies any CP, palpitations, SOB, dizziness, lightheadedness. Patient now presents in anticipation of elective radiofrequency ablation of atrial fibrillation w/Dr De León scheduled for 2/9/2022    Plan:   Labs pending.   Pre-procedure instructions provided (verbal & written) as follows:  Ablation 2/9/2022  - Last dose Eliquis 2/8/2022 PM (NO a/c day of ablation).   - NPO after midnight prior except meds w/ sips of water.   - Hold the following medications the morning of the procedure: oral diabetes medications, eliquis    Pt was educated on preop preparation with written and verbal instructions. Pt was educated on NSAIDs, multivitamins and herbals that increase the risk of bleeding and need to be stopped 7 days before procedure. Pt was educated on covid testing and covid prevention, i.e. social distancing, handwashing, mask wearing. Pt verbalized understanding of the above.     OPIOID RISK TOOL    SOLOMON EACH BOX THAT APPLIES AND ADD TOTALS AT THE END    FAMILY HISTORY OF SUBSTANCE ABUSE                 FEMALE         MALE                                                Alcohol                             [  ]1 pt          [  ]3pts                                               Illegal Durgs                     [  ]2 pts        [  ]3pts                                               Rx Drugs                           [  ]4 pts        [  ]4 pts    PERSONAL HISTORY OF SUBSTANCE ABUSE                                                                                          Alcohol                             [  ]3 pts       [  ]3 pts                                               Illegal Drugs                     [  ]4 pts        [  ]4 pts                                               Rx Drugs                           [  ]5 pts        [  ]5 pts    AGE BETWEEN 16-45 YEARS                                      [  ]1 pt         [  ]1 pt    HISTORY OF PREADOLESCENT   SEXUAL ABUSE                                                             [  ]3 pts        [  ]0pts    PSYCHOLOGICAL DISEASE                     ADD, OCD, Bipolar, Schizophrenia        [  ]2 pts         [  ]2 pts                      Depression                                               [  ]1 pt           [  ]1 pt           SCORING TOTAL   (add numbers and type here)              ( 0 )                                     A score of 3 or lower indicated LOW risk for future opioid abuse  A score of 4 to 7 indicated moderate risk for future opioid abuse  A score of 8 or higher indicates a high risk for opioid abuse 75 yo M PMH of DM2, HLD, persistent atrial fibrillation who underwent cardioversion in 2021 and had a recurrence of Afib approximately 4 hours after cardioversion. Patient is maintained on Eliquis and has not missed any doses. Patient with normal EF and non obstructive CAD on cardiac cath from 2021. He is s/p Sotalol load on 120 mg bid and DCCV on 21. He believes he stayed in sinus rhythm for few days and he felt well then he started feeling palpitations. Today patient reports feeling intermittent palpitations, WILKES, occasional dizziness and lightheadedness. He denies chest pain or syncope. Patient now presents in anticipation of elective radiofrequency ablation of atrial fibrillation w/Dr De León scheduled for 2022    Plan:   Labs pending.   Pre-procedure instructions provided (verbal & written) as follows:  Ablation 2022  - Last dose Eliquis 2022 PM (NO a/c day of ablation).   - NPO after midnight prior except meds w/ sips of water.   - Hold the following medications the morning of the procedure: oral diabetes medications, eliquis    Pt was educated on preop preparation with written and verbal instructions. Pt was educated on NSAIDs, multivitamins and herbals that increase the risk of bleeding and need to be stopped 7 days before procedure. Pt was educated on covid testing and covid prevention, i.e. social distancing, handwashing, mask wearing. Pt verbalized understanding of the above.     OPIOID RISK TOOL    SOLOMON EACH BOX THAT APPLIES AND ADD TOTALS AT THE END    FAMILY HISTORY OF SUBSTANCE ABUSE                 FEMALE         MALE                                                Alcohol                             [  ]1 pt          [  ]3pts                                               Illegal Durgs                     [  ]2 pts        [  ]3pts                                               Rx Drugs                           [  ]4 pts        [  ]4 pts    PERSONAL HISTORY OF SUBSTANCE ABUSE                                                                                          Alcohol                             [  ]3 pts       [  ]3 pts                                               Illegal Drugs                     [  ]4 pts        [  ]4 pts                                               Rx Drugs                           [  ]5 pts        [  ]5 pts    AGE BETWEEN 16-45 YEARS                                      [  ]1 pt         [  ]1 pt    HISTORY OF PREADOLESCENT   SEXUAL ABUSE                                                             [  ]3 pts        [  ]0pts    PSYCHOLOGICAL DISEASE                     ADD, OCD, Bipolar, Schizophrenia        [  ]2 pts         [  ]2 pts                      Depression                                               [  ]1 pt           [  ]1 pt           SCORING TOTAL   (add numbers and type here)              ( 0 )                                     A score of 3 or lower indicated LOW risk for future opioid abuse  A score of 4 to 7 indicated moderate risk for future opioid abuse  A score of 8 or higher indicates a high risk for opioid abuse    VICENTEI VTE 2.0 SCORE [CLOT updated 2019]    AGE RELATED RISK FACTORS                                                       MOBILITY RELATED FACTORS  [ ] Age 41-60 years                                            (1 Point)                    [ ] Bed rest                                                        (1 Point)  [ ] Age: 61-74 years                                           (2 Points)                  [ ] Plaster cast                                                   (2 Points)  [x ] Age= 75 years                                              (3 Points)                    [ ] Bed bound for more than 72 hours                 (2 Points)    DISEASE RELATED RISK FACTORS                                               GENDER SPECIFIC FACTORS  [ ] Edema in the lower extremities                       (1 Point)              [ ] Pregnancy                                                     (1 Point)  [ ] Varicose veins                                               (1 Point)                     [ ] Post-partum < 6 weeks                                   (1 Point)             [x ] BMI > 25 Kg/m2                                            (1 Point)                     [ ] Hormonal therapy  or oral contraception          (1 Point)                 [ ] Sepsis (in the previous month)                        (1 Point)               [ ] History of pregnancy complications                 (1 point)  [ ] Pneumonia or serious lung disease                                               [ ] Unexplained or recurrent                     (1 Point)           (in the previous month)                               (1 Point)  [ ] Abnormal pulmonary function test                     (1 Point)                 SURGERY RELATED RISK FACTORS  [ ] Acute myocardial infarction                              (1 Point)               [ ]  Section                                             (1 Point)  [ ] Congestive heart failure (in the previous month)  (1 Point)      [ ] Minor surgery                                                  (1 Point)   [ ] Inflammatory bowel disease                             (1 Point)               [ ] Arthroscopic surgery                                        (2 Points)  [ ] Central venous access                                      (2 Points)                [x ] General surgery lasting more than 45 minutes (2 points)  [ ] Malignancy- Present or previous                   (2 Points)                [ ] Elective arthroplasty                                         (5 points)    [ ] Stroke (in the previous month)                          (5 Points)                                                                                                                                                           HEMATOLOGY RELATED FACTORS                                                 TRAUMA RELATED RISK FACTORS  [ ] Prior episodes of VTE                                     (3 Points)                [ ] Fracture of the hip, pelvis, or leg                       (5 Points)  [ ] Positive family history for VTE                         (3 Points)             [ ] Acute spinal cord injury (in the previous month)  (5 Points)  [ ] Prothrombin 01704 A                                     (3 Points)               [ ] Paralysis  (less than 1 month)                             (5 Points)  [ ] Factor V Leiden                                             (3 Points)                  [ ] Multiple Trauma within 1 month                        (5 Points)  [ ] Lupus anticoagulants                                     (3 Points)                                                           [ ] Anticardiolipin antibodies                               (3 Points)                                                       [ ] High homocysteine in the blood                      (3 Points)                                             [ ] Other congenital or acquired thrombophilia      (3 Points)                                                [ ] Heparin induced thrombocytopenia                  (3 Points)                                     Total Score [    6      ] 77 yo M PMH of DM2, HLD, persistent atrial fibrillation who underwent cardioversion in 2021 and had a recurrence of Afib approximately 4 hours after cardioversion, he has been maintained on Eliquis and has not missed any doses. Patient with normal EF and non obstructive CAD on cardiac cath from 2021. He is s/p Sotalol load on 120 mg bid and DCCV on 21. He believes he was in sinus rhythm for few days and he felt well then he started feeling palpitations. Today patient reports feeling intermittent palpitations, WILKES, occasional dizziness and lightheadedness. He denies chest pain or syncope. Patient now presents in anticipation of elective radiofrequency ablation of atrial fibrillation w/Dr De León scheduled for 2022    Plan:   Labs pending.   Pre-procedure instructions provided (verbal & written) as follows:  Ablation 2022  - Last dose Eliquis 2022 PM (NO a/c day of ablation).   - NPO after midnight prior except meds w/sips of water.   - Hold the following medications the morning of the procedure: oral diabetes medications, eliquis    Pt was educated on preop preparation with written and verbal instructions. Pt was educated on NSAIDs, multivitamins and herbals that increase the risk of bleeding and need to be stopped 7 days before procedure. Pt was educated on covid testing and covid prevention, i.e. social distancing, handwashing, mask wearing. Pt verbalized understanding of the above.     OPIOID RISK TOOL    SOLOMON EACH BOX THAT APPLIES AND ADD TOTALS AT THE END    FAMILY HISTORY OF SUBSTANCE ABUSE                 FEMALE         MALE                                                Alcohol                             [  ]1 pt          [  ]3pts                                               Illegal Durgs                     [  ]2 pts        [  ]3pts                                               Rx Drugs                           [  ]4 pts        [  ]4 pts    PERSONAL HISTORY OF SUBSTANCE ABUSE                                                                                          Alcohol                             [  ]3 pts       [  ]3 pts                                               Illegal Drugs                     [  ]4 pts        [  ]4 pts                                               Rx Drugs                           [  ]5 pts        [  ]5 pts    AGE BETWEEN 16-45 YEARS                                      [  ]1 pt         [  ]1 pt    HISTORY OF PREADOLESCENT   SEXUAL ABUSE                                                             [  ]3 pts        [  ]0pts    PSYCHOLOGICAL DISEASE                     ADD, OCD, Bipolar, Schizophrenia        [  ]2 pts         [  ]2 pts                      Depression                                               [  ]1 pt           [  ]1 pt           SCORING TOTAL   (add numbers and type here)              ( 0 )                                     A score of 3 or lower indicated LOW risk for future opioid abuse  A score of 4 to 7 indicated moderate risk for future opioid abuse  A score of 8 or higher indicates a high risk for opioid abuse    VICENTEI VTE 2.0 SCORE [CLOT updated 2019]    AGE RELATED RISK FACTORS                                                       MOBILITY RELATED FACTORS  [ ] Age 41-60 years                                            (1 Point)                    [ ] Bed rest                                                        (1 Point)  [ ] Age: 61-74 years                                           (2 Points)                  [ ] Plaster cast                                                   (2 Points)  [x ] Age= 75 years                                              (3 Points)                    [ ] Bed bound for more than 72 hours                 (2 Points)    DISEASE RELATED RISK FACTORS                                               GENDER SPECIFIC FACTORS  [ ] Edema in the lower extremities                       (1 Point)              [ ] Pregnancy                                                     (1 Point)  [ ] Varicose veins                                               (1 Point)                     [ ] Post-partum < 6 weeks                                   (1 Point)             [x ] BMI > 25 Kg/m2                                            (1 Point)                     [ ] Hormonal therapy  or oral contraception          (1 Point)                 [ ] Sepsis (in the previous month)                        (1 Point)               [ ] History of pregnancy complications                 (1 point)  [ ] Pneumonia or serious lung disease                                               [ ] Unexplained or recurrent                     (1 Point)           (in the previous month)                               (1 Point)  [ ] Abnormal pulmonary function test                     (1 Point)                 SURGERY RELATED RISK FACTORS  [ ] Acute myocardial infarction                              (1 Point)               [ ]  Section                                             (1 Point)  [ ] Congestive heart failure (in the previous month)  (1 Point)      [ ] Minor surgery                                                  (1 Point)   [ ] Inflammatory bowel disease                             (1 Point)               [ ] Arthroscopic surgery                                        (2 Points)  [ ] Central venous access                                      (2 Points)                [x ] General surgery lasting more than 45 minutes (2 points)  [ ] Malignancy- Present or previous                   (2 Points)                [ ] Elective arthroplasty                                         (5 points)    [ ] Stroke (in the previous month)                          (5 Points)                                                                                                                                                           HEMATOLOGY RELATED FACTORS                                                 TRAUMA RELATED RISK FACTORS  [ ] Prior episodes of VTE                                     (3 Points)                [ ] Fracture of the hip, pelvis, or leg                       (5 Points)  [ ] Positive family history for VTE                         (3 Points)             [ ] Acute spinal cord injury (in the previous month)  (5 Points)  [ ] Prothrombin 28365 A                                     (3 Points)               [ ] Paralysis  (less than 1 month)                             (5 Points)  [ ] Factor V Leiden                                             (3 Points)                  [ ] Multiple Trauma within 1 month                        (5 Points)  [ ] Lupus anticoagulants                                     (3 Points)                                                           [ ] Anticardiolipin antibodies                               (3 Points)                                                       [ ] High homocysteine in the blood                      (3 Points)                                             [ ] Other congenital or acquired thrombophilia      (3 Points)                                                [ ] Heparin induced thrombocytopenia                  (3 Points)                                     Total Score [    6      ]

## 2022-01-31 NOTE — H&P PST ADULT - ANESTHESIA, PREVIOUS REACTION, PROFILE
patient reports Hx of difficult intubation patient reports dysphagia in the past and Hx of difficult intubation

## 2022-01-31 NOTE — H&P PST ADULT - NSICDXPASTSURGICALHX_GEN_ALL_CORE_FT
PAST SURGICAL HISTORY:  History of cardioversion     S/P subdural hematoma evacuation 1969     PAST SURGICAL HISTORY:  History of cardioversion 6/2021, 11/2021    S/P subdural hematoma evacuation 1969

## 2022-02-04 ENCOUNTER — APPOINTMENT (OUTPATIENT)
Dept: CT IMAGING | Facility: CLINIC | Age: 77
End: 2022-02-04
Payer: MEDICARE

## 2022-02-04 ENCOUNTER — OUTPATIENT (OUTPATIENT)
Dept: OUTPATIENT SERVICES | Facility: HOSPITAL | Age: 77
LOS: 1 days | End: 2022-02-04
Payer: MEDICARE

## 2022-02-04 DIAGNOSIS — Z98.890 OTHER SPECIFIED POSTPROCEDURAL STATES: Chronic | ICD-10-CM

## 2022-02-04 DIAGNOSIS — I48.91 UNSPECIFIED ATRIAL FIBRILLATION: ICD-10-CM

## 2022-02-04 PROBLEM — E78.5 HYPERLIPIDEMIA, UNSPECIFIED: Chronic | Status: ACTIVE | Noted: 2022-01-31

## 2022-02-04 PROCEDURE — 75572 CT HRT W/3D IMAGE: CPT | Mod: 26

## 2022-02-04 PROCEDURE — 75572 CT HRT W/3D IMAGE: CPT

## 2022-02-09 ENCOUNTER — INPATIENT (INPATIENT)
Facility: HOSPITAL | Age: 77
LOS: 0 days | Discharge: HOME | DRG: 274 | End: 2022-02-10
Attending: INTERNAL MEDICINE | Admitting: INTERNAL MEDICINE
Payer: MEDICARE

## 2022-02-09 ENCOUNTER — TRANSCRIPTION ENCOUNTER (OUTPATIENT)
Age: 77
End: 2022-02-09

## 2022-02-09 VITALS
DIASTOLIC BLOOD PRESSURE: 95 MMHG | HEART RATE: 93 BPM | RESPIRATION RATE: 16 BRPM | TEMPERATURE: 98 F | WEIGHT: 210.1 LBS | SYSTOLIC BLOOD PRESSURE: 141 MMHG | HEIGHT: 72 IN | OXYGEN SATURATION: 97 %

## 2022-02-09 DIAGNOSIS — I48.91 UNSPECIFIED ATRIAL FIBRILLATION: ICD-10-CM

## 2022-02-09 DIAGNOSIS — Z98.890 OTHER SPECIFIED POSTPROCEDURAL STATES: Chronic | ICD-10-CM

## 2022-02-09 LAB
GLUCOSE BLDC GLUCOMTR-MCNC: 183 MG/DL — HIGH (ref 70–99)
GLUCOSE BLDC GLUCOMTR-MCNC: 194 MG/DL — HIGH (ref 70–99)
GLUCOSE BLDC GLUCOMTR-MCNC: 196 MG/DL — HIGH (ref 70–99)
GLUCOSE BLDC GLUCOMTR-MCNC: 196 MG/DL — HIGH (ref 70–99)
GLUCOSE BLDC GLUCOMTR-MCNC: 202 MG/DL — HIGH (ref 70–99)
GLUCOSE BLDC GLUCOMTR-MCNC: 218 MG/DL — HIGH (ref 70–99)
GLUCOSE BLDC GLUCOMTR-MCNC: 223 MG/DL — HIGH (ref 70–99)
GLUCOSE BLDC GLUCOMTR-MCNC: 226 MG/DL — HIGH (ref 70–99)
GLUCOSE BLDC GLUCOMTR-MCNC: 227 MG/DL — HIGH (ref 70–99)
GLUCOSE BLDC GLUCOMTR-MCNC: 238 MG/DL — HIGH (ref 70–99)
GLUCOSE BLDC GLUCOMTR-MCNC: 252 MG/DL — HIGH (ref 70–99)
GLUCOSE BLDC GLUCOMTR-MCNC: 260 MG/DL — HIGH (ref 70–99)

## 2022-02-09 PROCEDURE — 93010 ELECTROCARDIOGRAM REPORT: CPT

## 2022-02-09 RX ORDER — FUROSEMIDE 40 MG
20 TABLET ORAL ONCE
Refills: 0 | Status: COMPLETED | OUTPATIENT
Start: 2022-02-09 | End: 2022-02-09

## 2022-02-09 RX ORDER — ASPIRIN/CALCIUM CARB/MAGNESIUM 324 MG
81 TABLET ORAL DAILY
Refills: 0 | Status: DISCONTINUED | OUTPATIENT
Start: 2022-02-09 | End: 2022-02-10

## 2022-02-09 RX ORDER — COLCHICINE 0.6 MG
0.6 TABLET ORAL DAILY
Refills: 0 | Status: DISCONTINUED | OUTPATIENT
Start: 2022-02-09 | End: 2022-02-10

## 2022-02-09 RX ORDER — APIXABAN 2.5 MG/1
5 TABLET, FILM COATED ORAL
Refills: 0 | Status: DISCONTINUED | OUTPATIENT
Start: 2022-02-09 | End: 2022-02-10

## 2022-02-09 RX ORDER — ACETAMINOPHEN 500 MG
650 TABLET ORAL EVERY 6 HOURS
Refills: 0 | Status: DISCONTINUED | OUTPATIENT
Start: 2022-02-09 | End: 2022-02-10

## 2022-02-09 RX ORDER — SOTALOL HCL 120 MG
120 TABLET ORAL
Refills: 0 | Status: DISCONTINUED | OUTPATIENT
Start: 2022-02-09 | End: 2022-02-10

## 2022-02-09 RX ORDER — METFORMIN HYDROCHLORIDE 850 MG/1
1000 TABLET ORAL
Refills: 0 | Status: DISCONTINUED | OUTPATIENT
Start: 2022-02-09 | End: 2022-02-10

## 2022-02-09 RX ORDER — SODIUM CHLORIDE 9 MG/ML
1000 INJECTION, SOLUTION INTRAVENOUS
Refills: 0 | Status: DISCONTINUED | OUTPATIENT
Start: 2022-02-09 | End: 2022-02-10

## 2022-02-09 RX ORDER — DEXTROSE 50 % IN WATER 50 %
12.5 SYRINGE (ML) INTRAVENOUS ONCE
Refills: 0 | Status: DISCONTINUED | OUTPATIENT
Start: 2022-02-09 | End: 2022-02-10

## 2022-02-09 RX ORDER — SITAGLIPTIN 50 MG/1
1 TABLET, FILM COATED ORAL
Qty: 0 | Refills: 0 | DISCHARGE

## 2022-02-09 RX ORDER — DEXTROSE 50 % IN WATER 50 %
25 SYRINGE (ML) INTRAVENOUS ONCE
Refills: 0 | Status: DISCONTINUED | OUTPATIENT
Start: 2022-02-09 | End: 2022-02-10

## 2022-02-09 RX ORDER — BENZOCAINE AND MENTHOL 5; 1 G/100ML; G/100ML
1 LIQUID ORAL
Refills: 0 | Status: DISCONTINUED | OUTPATIENT
Start: 2022-02-09 | End: 2022-02-10

## 2022-02-09 RX ORDER — SUCRALFATE 1 G
1 TABLET ORAL
Refills: 0 | Status: DISCONTINUED | OUTPATIENT
Start: 2022-02-09 | End: 2022-02-10

## 2022-02-09 RX ORDER — ASPIRIN/CALCIUM CARB/MAGNESIUM 324 MG
1 TABLET ORAL
Qty: 0 | Refills: 0 | DISCHARGE

## 2022-02-09 RX ORDER — ONDANSETRON 8 MG/1
4 TABLET, FILM COATED ORAL EVERY 8 HOURS
Refills: 0 | Status: DISCONTINUED | OUTPATIENT
Start: 2022-02-09 | End: 2022-02-10

## 2022-02-09 RX ORDER — INSULIN LISPRO 100/ML
VIAL (ML) SUBCUTANEOUS
Refills: 0 | Status: DISCONTINUED | OUTPATIENT
Start: 2022-02-09 | End: 2022-02-10

## 2022-02-09 RX ORDER — DEXTROSE 50 % IN WATER 50 %
15 SYRINGE (ML) INTRAVENOUS ONCE
Refills: 0 | Status: DISCONTINUED | OUTPATIENT
Start: 2022-02-09 | End: 2022-02-10

## 2022-02-09 RX ORDER — ALPRAZOLAM 0.25 MG
0.25 TABLET ORAL EVERY 8 HOURS
Refills: 0 | Status: DISCONTINUED | OUTPATIENT
Start: 2022-02-09 | End: 2022-02-10

## 2022-02-09 RX ORDER — GLUCAGON INJECTION, SOLUTION 0.5 MG/.1ML
1 INJECTION, SOLUTION SUBCUTANEOUS ONCE
Refills: 0 | Status: DISCONTINUED | OUTPATIENT
Start: 2022-02-09 | End: 2022-02-10

## 2022-02-09 RX ORDER — MAGNESIUM SULFATE 500 MG/ML
2 VIAL (ML) INJECTION ONCE
Refills: 0 | Status: COMPLETED | OUTPATIENT
Start: 2022-02-09 | End: 2022-02-09

## 2022-02-09 RX ORDER — PANTOPRAZOLE SODIUM 20 MG/1
40 TABLET, DELAYED RELEASE ORAL
Refills: 0 | Status: DISCONTINUED | OUTPATIENT
Start: 2022-02-09 | End: 2022-02-10

## 2022-02-09 RX ORDER — ROSUVASTATIN CALCIUM 5 MG/1
1 TABLET ORAL
Qty: 0 | Refills: 0 | DISCHARGE

## 2022-02-09 RX ORDER — ATORVASTATIN CALCIUM 80 MG/1
20 TABLET, FILM COATED ORAL AT BEDTIME
Refills: 0 | Status: DISCONTINUED | OUTPATIENT
Start: 2022-02-09 | End: 2022-02-10

## 2022-02-09 RX ADMIN — ATORVASTATIN CALCIUM 20 MILLIGRAM(S): 80 TABLET, FILM COATED ORAL at 21:02

## 2022-02-09 RX ADMIN — METFORMIN HYDROCHLORIDE 1000 MILLIGRAM(S): 850 TABLET ORAL at 18:07

## 2022-02-09 RX ADMIN — Medication 20 MILLIGRAM(S): at 17:00

## 2022-02-09 RX ADMIN — Medication 120 MILLIGRAM(S): at 18:07

## 2022-02-09 RX ADMIN — Medication 0.6 MILLIGRAM(S): at 22:31

## 2022-02-09 RX ADMIN — Medication 50 GRAM(S): at 13:30

## 2022-02-09 RX ADMIN — Medication 1 GRAM(S): at 18:07

## 2022-02-09 RX ADMIN — Medication 2: at 17:02

## 2022-02-09 RX ADMIN — PANTOPRAZOLE SODIUM 40 MILLIGRAM(S): 20 TABLET, DELAYED RELEASE ORAL at 18:07

## 2022-02-09 RX ADMIN — APIXABAN 5 MILLIGRAM(S): 2.5 TABLET, FILM COATED ORAL at 18:07

## 2022-02-09 NOTE — PROGRESS NOTE ADULT - SUBJECTIVE AND OBJECTIVE BOX
PROCEDURE(S): Radiofrequency Ablation of Atrial Fibrillation    ELECTROPHYSIOLOGIST(S): Ciara De León MD         COMPLICATIONS:  none        DISPOSITION:  observation     CONDITION: stable      Pt doing well s/p atrial fibrillation ablation via b/l FV (Hemostasis via figure 8 groin sutures). Cardioverted x 5. Denies complaint.       MEDICATIONS  (STANDING):  apixaban 5 milliGRAM(s) Oral two times a day  aspirin enteric coated 81 milliGRAM(s) Oral daily  atorvastatin 20 milliGRAM(s) Oral at bedtime  colchicine 0.6 milliGRAM(s) Oral daily  dextrose 40% Gel 15 Gram(s) Oral once  dextrose 5%. 1000 milliLiter(s) (50 mL/Hr) IV Continuous <Continuous>  dextrose 5%. 1000 milliLiter(s) (100 mL/Hr) IV Continuous <Continuous>  dextrose 50% Injectable 25 Gram(s) IV Push once  dextrose 50% Injectable 12.5 Gram(s) IV Push once  dextrose 50% Injectable 25 Gram(s) IV Push once  furosemide   Injectable 20 milliGRAM(s) IV Push once  glucagon  Injectable 1 milliGRAM(s) IntraMuscular once  insulin lispro (ADMELOG) corrective regimen sliding scale   SubCutaneous three times a day before meals  magnesium sulfate  IVPB 2 Gram(s) IV Intermittent once  metFORMIN 1000 milliGRAM(s) Oral two times a day  pantoprazole    Tablet 40 milliGRAM(s) Oral two times a day  sotalol 120 milliGRAM(s) Oral two times a day  sucralfate suspension 1 Gram(s) Oral two times a day    MEDICATIONS  (PRN):  acetaminophen     Tablet .. 650 milliGRAM(s) Oral every 6 hours PRN Mild Pain (1 - 3)  ALPRAZolam 0.25 milliGRAM(s) Oral every 8 hours PRN anxiety/ insomnia  aluminum hydroxide/magnesium hydroxide/simethicone Suspension 30 milliLiter(s) Oral every 4 hours PRN Dyspepsia  benzocaine 15 mG/menthol 3.6 mG Lozenge 1 Lozenge Oral every 2 hours PRN Sore Throat  ondansetron  IVPB 4 milliGRAM(s) IV Intermittent every 8 hours PRN Nausea and/or Vomiting      Allergies  No Known Allergies    Exam:   HR: 66 (02-09-22 @ 13:15) (66 - 93)  BP: 110/73 (02-09-22 @ 13:15) (110/73 - 141/95)  RR: 18 (02-09-22 @ 13:15) (16 - 18)  SpO2: 93% (02-09-22 @ 13:15) (93% - 97%)    VSS, NAD, sleepy from anesthesia  Card: S1/S2, RRR, no m/g/r  Resp: lungs CTA b/l  Abd: S/NT/ND  Groins: hemostatic sutures in place; sites C/D/I; no bleeding, hematoma, erythema, exudate or edema  Ext: no edema; distal pulses intact    I/Os: net + [id number]    ECG: Sinus rhythm at 67bpm. Diffuse Twi. Q wave in III, aVF    Assessment:   75 yo M PMH of DM2, HLD, persistent atrial fibrillation who underwent cardioversion in June 2021 and had a recurrence of Afib approximately 4 hours after cardioversion. He was started on a Sotalol load and underwent another DCCV on 11/11/21. He believes he was in sinus rhythm for few days and he felt well then he started feeling intermittent palpitations, WILKES, occasional dizziness and lightheadedness. He denies chest pain or syncope. He presented electively on 2/9/2022 for and is now status post uncomplicated radiofrequency ablation of atrial fibrillation via b/l FV.      Plan:   Bedrest x 4 hours, then OOB with assistance and progress as tolerated.   Groin sutures to be removed by EP service in AM.   Radial art line to be removed once pt fully awake with stable vitals >1 hour post op.   Pending groin status: 5mg PO eliquis to resume at 17:30 tonight.   Lasix 20mg IV x 1 dose once ambulating.  Continue Sotalol 120mg twice daily.  Continue other home medications.   Start Protonix 40mg twice daily x 2 weeks, then once daily x 6 weeks.   Carafate 1gm BID x 2 weeks.   Start Colchicine 0.6mg daily x 30 days, as tolerated.  Strict I/Os.  Please encourage incentive spirometry and ambulation once able.  Observation and monitoring on telemetry overnight with anticipated discharge in the AM and outpt follow up in 2-4 weeks.  PROCEDURE(S): Radiofrequency Ablation of Atrial Fibrillation    ELECTROPHYSIOLOGIST(S): Ciara De León MD         COMPLICATIONS:  none        DISPOSITION:  observation     CONDITION: stable      Pt doing well s/p atrial fibrillation ablation ([facility]/PVI, PW, CTI) via b/l FV. Hemostasis via figure 8 groin sutures. Cardioverted x 5. Denies complaint.       MEDICATIONS  (STANDING):  apixaban 5 milliGRAM(s) Oral two times a day  aspirin enteric coated 81 milliGRAM(s) Oral daily  atorvastatin 20 milliGRAM(s) Oral at bedtime  colchicine 0.6 milliGRAM(s) Oral daily  dextrose 40% Gel 15 Gram(s) Oral once  dextrose 5%. 1000 milliLiter(s) (50 mL/Hr) IV Continuous <Continuous>  dextrose 5%. 1000 milliLiter(s) (100 mL/Hr) IV Continuous <Continuous>  dextrose 50% Injectable 25 Gram(s) IV Push once  dextrose 50% Injectable 12.5 Gram(s) IV Push once  dextrose 50% Injectable 25 Gram(s) IV Push once  furosemide   Injectable 20 milliGRAM(s) IV Push once  glucagon  Injectable 1 milliGRAM(s) IntraMuscular once  insulin lispro (ADMELOG) corrective regimen sliding scale   SubCutaneous three times a day before meals  magnesium sulfate  IVPB 2 Gram(s) IV Intermittent once  metFORMIN 1000 milliGRAM(s) Oral two times a day  pantoprazole    Tablet 40 milliGRAM(s) Oral two times a day  sotalol 120 milliGRAM(s) Oral two times a day  sucralfate suspension 1 Gram(s) Oral two times a day    MEDICATIONS  (PRN):  acetaminophen     Tablet .. 650 milliGRAM(s) Oral every 6 hours PRN Mild Pain (1 - 3)  ALPRAZolam 0.25 milliGRAM(s) Oral every 8 hours PRN anxiety/ insomnia  aluminum hydroxide/magnesium hydroxide/simethicone Suspension 30 milliLiter(s) Oral every 4 hours PRN Dyspepsia  benzocaine 15 mG/menthol 3.6 mG Lozenge 1 Lozenge Oral every 2 hours PRN Sore Throat  ondansetron  IVPB 4 milliGRAM(s) IV Intermittent every 8 hours PRN Nausea and/or Vomiting      Allergies  No Known Allergies    Exam:   HR: 66 (02-09-22 @ 13:15) (66 - 93)  BP: 110/73 (02-09-22 @ 13:15) (110/73 - 141/95)  RR: 18 (02-09-22 @ 13:15) (16 - 18)  SpO2: 93% (02-09-22 @ 13:15) (93% - 97%)    VSS, NAD, sleepy from anesthesia  Card: S1/S2, RRR, no m/g/r  Resp: lungs CTA b/l  Abd: S/NT/ND  Groins: hemostatic sutures in place; sites C/D/I; no bleeding, hematoma, erythema, exudate or edema  Ext: no edema; distal pulses intact    I/Os: net + 2943    ECG: Sinus rhythm at 67bpm. Diffuse Twi. Q wave in III, aVF    Assessment:   75 yo M PMH of DM2, HLD, persistent atrial fibrillation who underwent cardioversion in June 2021 and had a recurrence of Afib approximately 4 hours after cardioversion. He was started on a Sotalol load and underwent another DCCV on 11/11/21. He believes he was in sinus rhythm for few days and he felt well then he started feeling intermittent palpitations, WILKES, occasional dizziness and lightheadedness. He denies chest pain or syncope. He presented electively on 2/9/2022 for and is now status post uncomplicated radiofrequency ablation of atrial fibrillation via b/l FV.      Plan:   Bedrest x 4 hours, then OOB with assistance and progress as tolerated.   Groin sutures to be removed by EP service in AM.   Radial art line to be removed once pt fully awake with stable vitals >1 hour post op.   Pending groin status: 5mg PO eliquis to resume at 17:30 tonight.   Lasix 20mg IV x 1 dose once ambulating.  Continue Sotalol 120mg twice daily.  Continue other home medications.   Start Protonix 40mg twice daily x 2 weeks, then once daily x 6 weeks.   Carafate 1gm BID x 2 weeks.   Start Colchicine 0.6mg daily x 30 days, as tolerated.  Strict I/Os.  Please encourage incentive spirometry and ambulation once able.  Observation and monitoring on telemetry overnight with anticipated discharge in the AM and outpt follow up in 2-4 weeks.

## 2022-02-09 NOTE — PROGRESS NOTE ADULT - SUBJECTIVE AND OBJECTIVE BOX
Admission Criteria  Please admit the patient to the following service: CARDIOLOGY    Major Criteria:    - Continuous EKG monitoring is required for condition causing arrhythmia (hyperkalemia, etc)    - Significant volume load > 200 ml      Admit to: 3G[facility]     Patient is being admitted to the inpatient service due to high risk characteristics and need for further management/monitoring and is considered to be at a significantly increased risk of major adverse cardiac and vascular events if discharged.

## 2022-02-09 NOTE — DISCHARGE NOTE PROVIDER - NSDCFUADDINST_GEN_ALL_CORE_FT
Our o Continue Eliquis WITHOUT interruption.  Start Protonix 40mg twice daily for 2 weeks, then decrease to once daily for 6 weeks then stop.  Start Carafate liquid twice daily for 2 weeks then stop.  Start Colchicine 0.6mg daily x 30 days, as tolerated. If diarrhea occurs, can half tablet daily.   Follow up with primary care physician for elevated HgA1C (HgA1C 11)

## 2022-02-09 NOTE — DISCHARGE NOTE PROVIDER - HOSPITAL COURSE
75 yo M PMH of DM2, HLD, persistent atrial fibrillation who underwent cardioversion in June 2021 and had a recurrence of Afib approximately 4 hours after cardioversion. He was started on a Sotalol load and underwent another DCCV on 11/11/21. He believes he was in sinus rhythm for few days and he felt well then he started feeling intermittent palpitations, WILKES, occasional dizziness and lightheadedness. He denies chest pain or syncope. He presented electively on 2/9/2022 for and is now status post uncomplicated radiofrequency ablation of atrial fibrillation via b/l FV.

## 2022-02-09 NOTE — DISCHARGE NOTE PROVIDER - CARE PROVIDER_API CALL
Ciara De León)  Cardiac Electrophysiology; Cardiovascular Disease; Internal Medicine  [address] [place] [address]  Phone: [phone]  Fax: [phone]  Established Patient  Follow Up Time:

## 2022-02-09 NOTE — DISCHARGE NOTE PROVIDER - NSDCMRMEDTOKEN_GEN_ALL_CORE_FT
Eliquis 5 mg oral tablet: 1 tab(s) orally 2 times a day  Januvia 25 mg oral tablet: 1 tab(s) orally once a day  Low Dose ASA 81 mg oral tablet: 1 tab(s) orally once a day  metFORMIN 500 mg oral tablet, extended release: 2 tab(s) orally 2 times a day  rosuvastatin 5 mg oral tablet: 1 tab(s) orally once a day  sotalol 120 mg oral tablet: 1 tab(s) orally 2 times a day    colchicine 0.6 mg oral tablet: 1 tab(s) orally once a day x 30 days   Eliquis 5 mg oral tablet: 1 tab(s) orally 2 times a day  Januvia 25 mg oral tablet: 1 tab(s) orally once a day  Low Dose ASA 81 mg oral tablet: 1 tab(s) orally once a day  metFORMIN 500 mg oral tablet, extended release: 2 tab(s) orally 2 times a day  pantoprazole 40 mg oral delayed release tablet: 1 tab(s) orally 2 times a day x 14 days followed by 1 tab orally daily x 30 days.   rosuvastatin 5 mg oral tablet: 1 tab(s) orally once a day  sotalol 120 mg oral tablet: 1 tab(s) orally 2 times a day   sucralfate 1 g/10 mL oral suspension: 10 milliliter(s) orally 2 times a day x 14 days

## 2022-02-09 NOTE — PROGRESS NOTE ADULT - SUBJECTIVE AND OBJECTIVE BOX
Pt arrived for scheduled AF ablation with Dr. De León. PST performed on 1/31/2022. Labs reviewed. HgA1c noted to be 11. NPO*** Last Eliquis ***    Pt is a 75 yo M PMH of DM2, HLD, persistent atrial fibrillation who underwent cardioversion in June 2021 and had a recurrence of Afib approximately 4 hours after cardioversion. He was started on a Sotalol load and underwent another DCCV on 11/11/21. He believes he was in sinus rhythm for few days and he felt well then he started feeling intermittent palpitations, WILKES, occasional dizziness and lightheadedness. He denies chest pain or syncope. He now presents electively today for a radiofrequency ablation of atrial fibrillation. CT heart performed on 2/7/2022 and was negative for thrombus.     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    CT HEART LEFT ATRIUM 2/7/2022  FINDINGS:  MORPHOLOGY:  Left atrium: The left atrium is enlarged. No thrombus in the left atrial   appendage. No thrombus in the left atrium.    There are bilateral superior and inferior pulmonary veins. No accessory   pulmonary veins. The pulmonary veins measure:  *  Right superior pulmonary vein: 20 x 23 mm.  *  Right inferior pulmonary vein: 23 x 21 mm.  *  Left superior pulmonary vein: 27 x 18 mm.  *  Left inferior pulmonary vein: 22 x 17 mm.    LEFT VENTRICLE: The left ventricle is unremarkable.  RIGHT ATRIUM: The SVC and IVC inflow are unremarkable. Patent foramen   ovale.  RIGHT VENTRICLE: Right ventricle is unremarkable.  VALVES: The anterior and posterior leaflets of the mitral valve are   normal. The aortic valve is trileaflet.  CORONARY: Likely left coronary artery dominance. No anomalous coronary   arteries..  LEFT MAIN: Normal bifurcation into LAD, LCX. There is noncalcified plaque   in the left main coronary artery.  LEFT ANTERIOR DESCENDING: Three patent diagonal branches. Distal vessel   wraps around apex is calcified and noncalcified plaque in the proximal,   mid, and distal left anterior descending coronary artery. There is also   plaque within the first diagonal division.  LEFT CIRCUMFLEX: Two patent obtuse marginal branches. There is   noncalcified plaque in the circumflex coronary artery and obtuse marginal   system. Is rise to the PDA and PLV divisions.  RIGHT CORONARY ARTERY: Small, likely nondominant vessel.  PERICARDIUM: Normal pericardial contour. No pericardial effusion.  NONCARDIAC FINDINGS: The esophagus is thickened. The chest lymph nodes   image measure less than 10 mm in the short axis. The visualized aorta is   normal in caliber. Atheromatous disease of the aorta. The included upper   abdomen is unremarkable. Degenerative change of the spine. Unremarkable   soft tissues.    IMPRESSION:    1.  There are bilateral superior and inferior pulmonary veins.  2.  The left atrium is enlarged.  3.  No thrombus in the left atrial appendage. Pt arrived for scheduled AF ablation with Dr. De León. PST performed on 1/31/2022. Labs reviewed. HgA1C noted to be 11; He reports that he was recently restarted on Januiva. NPO confirmed. Last Eliquis dose yesterday evening.    Pt is a 77 yo M PMH of DM2, HLD, persistent atrial fibrillation who underwent cardioversion in June 2021 and had a recurrence of Afib approximately 4 hours after cardioversion. He was started on a Sotalol load and underwent another DCCV on 11/11/21. He believes he was in sinus rhythm for few days and he felt well then he started feeling intermittent palpitations, WILKES, occasional dizziness and lightheadedness. He denies chest pain or syncope. He now presents electively today for a radiofrequency ablation of atrial fibrillation. CT heart performed on 2/7/2022 and was negative for thrombus.     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    CT HEART LEFT ATRIUM 2/7/2022  FINDINGS:  MORPHOLOGY:  Left atrium: The left atrium is enlarged. No thrombus in the left atrial   appendage. No thrombus in the left atrium.    There are bilateral superior and inferior pulmonary veins. No accessory   pulmonary veins. The pulmonary veins measure:  *  Right superior pulmonary vein: 20 x 23 mm.  *  Right inferior pulmonary vein: 23 x 21 mm.  *  Left superior pulmonary vein: 27 x 18 mm.  *  Left inferior pulmonary vein: 22 x 17 mm.    LEFT VENTRICLE: The left ventricle is unremarkable.  RIGHT ATRIUM: The SVC and IVC inflow are unremarkable. Patent foramen   ovale.  RIGHT VENTRICLE: Right ventricle is unremarkable.  VALVES: The anterior and posterior leaflets of the mitral valve are   normal. The aortic valve is trileaflet.  CORONARY: Likely left coronary artery dominance. No anomalous coronary   arteries..  LEFT MAIN: Normal bifurcation into LAD, LCX. There is noncalcified plaque   in the left main coronary artery.  LEFT ANTERIOR DESCENDING: Three patent diagonal branches. Distal vessel   wraps around apex is calcified and noncalcified plaque in the proximal,   mid, and distal left anterior descending coronary artery. There is also   plaque within the first diagonal division.  LEFT CIRCUMFLEX: Two patent obtuse marginal branches. There is   noncalcified plaque in the circumflex coronary artery and obtuse marginal   system. Is rise to the PDA and PLV divisions.  RIGHT CORONARY ARTERY: Small, likely nondominant vessel.  PERICARDIUM: Normal pericardial contour. No pericardial effusion.  NONCARDIAC FINDINGS: The esophagus is thickened. The chest lymph nodes   image measure less than 10 mm in the short axis. The visualized aorta is   normal in caliber. Atheromatous disease of the aorta. The included upper   abdomen is unremarkable. Degenerative change of the spine. Unremarkable   soft tissues.    IMPRESSION:    1.  There are bilateral superior and inferior pulmonary veins.  2.  The left atrium is enlarged.  3.  No thrombus in the left atrial appendage.

## 2022-02-10 ENCOUNTER — TRANSCRIPTION ENCOUNTER (OUTPATIENT)
Age: 77
End: 2022-02-10

## 2022-02-10 ENCOUNTER — NON-APPOINTMENT (OUTPATIENT)
Age: 77
End: 2022-02-10

## 2022-02-10 VITALS
RESPIRATION RATE: 16 BRPM | DIASTOLIC BLOOD PRESSURE: 75 MMHG | SYSTOLIC BLOOD PRESSURE: 129 MMHG | HEART RATE: 87 BPM | OXYGEN SATURATION: 96 %

## 2022-02-10 LAB
ANION GAP SERPL CALC-SCNC: 14 MMOL/L — SIGNIFICANT CHANGE UP (ref 5–17)
BUN SERPL-MCNC: 16.5 MG/DL — SIGNIFICANT CHANGE UP (ref 8–20)
CALCIUM SERPL-MCNC: 8.5 MG/DL — LOW (ref 8.6–10.2)
CHLORIDE SERPL-SCNC: 102 MMOL/L — SIGNIFICANT CHANGE UP (ref 98–107)
CO2 SERPL-SCNC: 22 MMOL/L — SIGNIFICANT CHANGE UP (ref 22–29)
CREAT SERPL-MCNC: 0.86 MG/DL — SIGNIFICANT CHANGE UP (ref 0.5–1.3)
GLUCOSE BLDC GLUCOMTR-MCNC: 242 MG/DL — HIGH (ref 70–99)
GLUCOSE SERPL-MCNC: 230 MG/DL — HIGH (ref 70–99)
HCT VFR BLD CALC: 38 % — LOW (ref 39–50)
HGB BLD-MCNC: 12.4 G/DL — LOW (ref 13–17)
MAGNESIUM SERPL-MCNC: 1.9 MG/DL — SIGNIFICANT CHANGE UP (ref 1.6–2.6)
MCHC RBC-ENTMCNC: 31.8 PG — SIGNIFICANT CHANGE UP (ref 27–34)
MCHC RBC-ENTMCNC: 32.6 GM/DL — SIGNIFICANT CHANGE UP (ref 32–36)
MCV RBC AUTO: 97.4 FL — SIGNIFICANT CHANGE UP (ref 80–100)
PLATELET # BLD AUTO: 141 K/UL — LOW (ref 150–400)
POTASSIUM SERPL-MCNC: 4.2 MMOL/L — SIGNIFICANT CHANGE UP (ref 3.5–5.3)
POTASSIUM SERPL-SCNC: 4.2 MMOL/L — SIGNIFICANT CHANGE UP (ref 3.5–5.3)
RBC # BLD: 3.9 M/UL — LOW (ref 4.2–5.8)
RBC # FLD: 13.2 % — SIGNIFICANT CHANGE UP (ref 10.3–14.5)
SODIUM SERPL-SCNC: 138 MMOL/L — SIGNIFICANT CHANGE UP (ref 135–145)
WBC # BLD: 9.72 K/UL — SIGNIFICANT CHANGE UP (ref 3.8–10.5)
WBC # FLD AUTO: 9.72 K/UL — SIGNIFICANT CHANGE UP (ref 3.8–10.5)

## 2022-02-10 PROCEDURE — 93657 TX L/R ATRIAL FIB ADDL: CPT

## 2022-02-10 PROCEDURE — 93010 ELECTROCARDIOGRAM REPORT: CPT

## 2022-02-10 PROCEDURE — 83735 ASSAY OF MAGNESIUM: CPT

## 2022-02-10 PROCEDURE — 93005 ELECTROCARDIOGRAM TRACING: CPT

## 2022-02-10 PROCEDURE — C1889: CPT

## 2022-02-10 PROCEDURE — 85027 COMPLETE CBC AUTOMATED: CPT

## 2022-02-10 PROCEDURE — 80048 BASIC METABOLIC PNL TOTAL CA: CPT

## 2022-02-10 PROCEDURE — C1731: CPT

## 2022-02-10 PROCEDURE — C1759: CPT

## 2022-02-10 PROCEDURE — C1894: CPT

## 2022-02-10 PROCEDURE — 36415 COLL VENOUS BLD VENIPUNCTURE: CPT

## 2022-02-10 PROCEDURE — C1766: CPT

## 2022-02-10 PROCEDURE — 82962 GLUCOSE BLOOD TEST: CPT

## 2022-02-10 PROCEDURE — C1732: CPT

## 2022-02-10 PROCEDURE — 93623 PRGRMD STIMJ&PACG IV RX NFS: CPT

## 2022-02-10 PROCEDURE — 93656 COMPRE EP EVAL ABLTJ ATR FIB: CPT

## 2022-02-10 RX ORDER — COLCHICINE 0.6 MG
1 TABLET ORAL
Qty: 30 | Refills: 0
Start: 2022-02-10 | End: 2022-03-11

## 2022-02-10 RX ORDER — SUCRALFATE 1 G
10 TABLET ORAL
Qty: 280 | Refills: 0
Start: 2022-02-10 | End: 2022-02-23

## 2022-02-10 RX ORDER — PANTOPRAZOLE SODIUM 20 MG/1
1 TABLET, DELAYED RELEASE ORAL
Qty: 28 | Refills: 0
Start: 2022-02-10 | End: 2022-02-23

## 2022-02-10 RX ADMIN — Medication 1 GRAM(S): at 05:44

## 2022-02-10 RX ADMIN — Medication 120 MILLIGRAM(S): at 05:44

## 2022-02-10 RX ADMIN — METFORMIN HYDROCHLORIDE 1000 MILLIGRAM(S): 850 TABLET ORAL at 05:44

## 2022-02-10 RX ADMIN — Medication 2: at 07:11

## 2022-02-10 RX ADMIN — PANTOPRAZOLE SODIUM 40 MILLIGRAM(S): 20 TABLET, DELAYED RELEASE ORAL at 05:44

## 2022-02-10 RX ADMIN — APIXABAN 5 MILLIGRAM(S): 2.5 TABLET, FILM COATED ORAL at 05:44

## 2022-02-10 NOTE — DISCHARGE NOTE NURSING/CASE MANAGEMENT/SOCIAL WORK - PATIENT PORTAL LINK FT
You can access the FollowMyHealth Patient Portal offered by [facility] by registering at the following website: [email or web address]. By joining [employer]’s FollowMyHealth portal, you will also be able to view your health information using other applications (apps) compatible with our system.

## 2022-02-10 NOTE — DISCHARGE NOTE NURSING/CASE MANAGEMENT/SOCIAL WORK - NSDCPEFALRISK_GEN_ALL_CORE
For information on Fall & Injury Prevention, visit: [email or web address] OR  [email or web address] OR  https://www.cdc.gov/steadi/patient.html

## 2022-02-10 NOTE — PROGRESS NOTE ADULT - SUBJECTIVE AND OBJECTIVE BOX
Patient seen today in bed. Figure 8 sutures removed from right groin without complication. Reports mild pericardial pain.     EKG: pending  TELE: SR with rates 70-80bpm    MEDICATIONS  (STANDING):  apixaban 5 milliGRAM(s) Oral two times a day  aspirin enteric coated 81 milliGRAM(s) Oral daily  atorvastatin 20 milliGRAM(s) Oral at bedtime  colchicine 0.6 milliGRAM(s) Oral daily  dextrose 40% Gel 15 Gram(s) Oral once  dextrose 5%. 1000 milliLiter(s) (50 mL/Hr) IV Continuous <Continuous>  dextrose 5%. 1000 milliLiter(s) (100 mL/Hr) IV Continuous <Continuous>  dextrose 50% Injectable 25 Gram(s) IV Push once  dextrose 50% Injectable 12.5 Gram(s) IV Push once  dextrose 50% Injectable 25 Gram(s) IV Push once  glucagon  Injectable 1 milliGRAM(s) IntraMuscular once  insulin lispro (ADMELOG) corrective regimen sliding scale   SubCutaneous three times a day before meals  metFORMIN 1000 milliGRAM(s) Oral two times a day  pantoprazole    Tablet 40 milliGRAM(s) Oral two times a day  sotalol 120 milliGRAM(s) Oral two times a day  sucralfate suspension 1 Gram(s) Oral two times a day    MEDICATIONS  (PRN):  acetaminophen     Tablet .. 650 milliGRAM(s) Oral every 6 hours PRN Mild Pain (1 - 3)  ALPRAZolam 0.25 milliGRAM(s) Oral every 8 hours PRN anxiety/ insomnia  aluminum hydroxide/magnesium hydroxide/simethicone Suspension 30 milliLiter(s) Oral every 4 hours PRN Dyspepsia  benzocaine 15 mG/menthol 3.6 mG Lozenge 1 Lozenge Oral every 2 hours PRN Sore Throat  ondansetron  IVPB 4 milliGRAM(s) IV Intermittent every 8 hours PRN Nausea and/or Vomiting    Allergies  No Known Allergies    PAST MEDICAL & SURGICAL HISTORY:  DM (diabetes mellitus)  HTN (hypertension)  Hyperlipidemia  Unspecified atrial fibrillation  History of cardioversion  6/2021, 11/2021  S/P subdural hematoma evacuation  1969    Vital Signs Last 24 Hrs  T(C): 36.8 (10 Feb 2022 05:50), Max: 36.8 (10 Feb 2022 05:50)  T(F): 98.3 (10 Feb 2022 05:50), Max: 98.3 (10 Feb 2022 05:50)  HR: 89 (10 Feb 2022 05:50) (66 - 89)  BP: 108/60 (10 Feb 2022 05:50) (106/67 - 125/77)  BP(mean): 79 (10 Feb 2022 05:50) (79 - 93)  RR: 17 (10 Feb 2022 05:50) (16 - 19)  SpO2: 97% (09 Feb 2022 21:00) (91% - 97%)    Physical Exam:  Constitutional: NAD, AAOx3  Cardiovascular: +S1S2 RRR  Pulmonary: CTA b/l, unlabored  GI: soft NTND +BS  Extremities: no pedal edema  Neuro: non focal, STONE x4    LABS:                        12.4   9.72  )-----------( 141      ( 10 Feb 2022 06:09 )             38.0     138  |  102  |  16.5  ----------------------------<  230<H>  4.2   |  22.0  |  0.86  Ca    8.5<L>      10 Feb 2022 06:09  Mg     1.9     02-10    A/P  76 year old male with a history of DM2, HLD, persistent atrial fibrillation who underwent cardioversion in June 2021 and had a recurrence of Afib approximately 4 hours after cardioversion. He is now status post uncomplicated radiofrequency ablation of atrial fibrillation via b/l FV.    - Discharge home today       Patient seen today in bed. Figure 8 sutures removed from right groin without complication. Reports mild pericardial pain.     EKG: pending  TELE: SR with rates 70-80bpm    MEDICATIONS  (STANDING):  apixaban 5 milliGRAM(s) Oral two times a day  aspirin enteric coated 81 milliGRAM(s) Oral daily  atorvastatin 20 milliGRAM(s) Oral at bedtime  colchicine 0.6 milliGRAM(s) Oral daily  dextrose 40% Gel 15 Gram(s) Oral once  dextrose 5%. 1000 milliLiter(s) (50 mL/Hr) IV Continuous <Continuous>  dextrose 5%. 1000 milliLiter(s) (100 mL/Hr) IV Continuous <Continuous>  dextrose 50% Injectable 25 Gram(s) IV Push once  dextrose 50% Injectable 12.5 Gram(s) IV Push once  dextrose 50% Injectable 25 Gram(s) IV Push once  glucagon  Injectable 1 milliGRAM(s) IntraMuscular once  insulin lispro (ADMELOG) corrective regimen sliding scale   SubCutaneous three times a day before meals  metFORMIN 1000 milliGRAM(s) Oral two times a day  pantoprazole    Tablet 40 milliGRAM(s) Oral two times a day  sotalol 120 milliGRAM(s) Oral two times a day  sucralfate suspension 1 Gram(s) Oral two times a day    MEDICATIONS  (PRN):  acetaminophen     Tablet .. 650 milliGRAM(s) Oral every 6 hours PRN Mild Pain (1 - 3)  ALPRAZolam 0.25 milliGRAM(s) Oral every 8 hours PRN anxiety/ insomnia  aluminum hydroxide/magnesium hydroxide/simethicone Suspension 30 milliLiter(s) Oral every 4 hours PRN Dyspepsia  benzocaine 15 mG/menthol 3.6 mG Lozenge 1 Lozenge Oral every 2 hours PRN Sore Throat  ondansetron  IVPB 4 milliGRAM(s) IV Intermittent every 8 hours PRN Nausea and/or Vomiting    Allergies  No Known Allergies    PAST MEDICAL & SURGICAL HISTORY:  DM (diabetes mellitus)  HTN (hypertension)  Hyperlipidemia  Unspecified atrial fibrillation  History of cardioversion  6/2021, 11/2021  S/P subdural hematoma evacuation  1969    Vital Signs Last 24 Hrs  T(C): 36.8 (10 Feb 2022 05:50), Max: 36.8 (10 Feb 2022 05:50)  T(F): 98.3 (10 Feb 2022 05:50), Max: 98.3 (10 Feb 2022 05:50)  HR: 89 (10 Feb 2022 05:50) (66 - 89)  BP: 108/60 (10 Feb 2022 05:50) (106/67 - 125/77)  BP(mean): 79 (10 Feb 2022 05:50) (79 - 93)  RR: 17 (10 Feb 2022 05:50) (16 - 19)  SpO2: 97% (09 Feb 2022 21:00) (91% - 97%)    Physical Exam:  Constitutional: NAD, AAOx3  Cardiovascular: +S1S2 RRR  Pulmonary: CTA b/l, unlabored  GI: soft NTND +BS  Extremities: no pedal edema  Neuro: non focal, STOEN x4    LABS:                        12.4   9.72  )-----------( 141      ( 10 Feb 2022 06:09 )             38.0     138  |  102  |  16.5  ----------------------------<  230<H>  4.2   |  22.0  |  0.86  Ca    8.5<L>      10 Feb 2022 06:09  Mg     1.9     02-10

## 2022-02-10 NOTE — DISCHARGE NOTE NURSING/CASE MANAGEMENT/SOCIAL WORK - BRAND OF COVID-19 VACCINATION
OT Acute Treatment  Plan of Care Note            Pt seen on [facility] nursing unit.      ASSESSMENT:   Emphasis of session included PROM to left UE and instruction in self-range of motion exercises for left UE with FYWB Handout. Patient with decreased tolerance to shoulder stretching in supine, stopping motion at 80 degrees of shoulder flexion due to increased pain.  Patient with decreased awareness of deficits, stating \"I do it all by myself I want to get out of here\". Patient unsure of reason for hospitalization, repeatedly stating \"what brought me in here, why does my left arm hurt\". Patient believes that her left arm is fine and does not limit her ability to participate, however complains of pain in the shoulder upon movement. Educated patient on the benefits of moving left arm as much as possible to aid in completion of  functional tasks. Patient becoming increasingly frustrated throughout session, declining out of bed activity for functional reaching. Patient will continue to benefit from skilled occupational therapy services to address deficits noted above for safety and increased independence of ADLs/IADLs.     OT Identified Barriers to Discharge: weakness, decreased mobiity, altered mental status     PLAN:   Continue skilled OT, including the following Treatment Interventions: ADL retraining;Functional transfer training;UE strengthening/ROM;Cognitive reorientation;Patient/Family training;Equipment eval/education;Compensatory technique education;Continued evaluation (06/19/18 1505)   OT Frequency: 6 days/week (06/19/18 1505)    Treatment Plan for Next Session: AAROM left UE, Self-cares in stance  Additional Plan Considerations: AAROM, standing tolerance, improved use of LUE during sit/stand transfers       DIAGNOSIS:  1. Acute UTI    2. Altered mental status, unspecified altered mental status type    3. Dehydration    4. Status post placement of implantable loop recorder    5. Delirium    6. Paroxysmal atrial  Moderna dose 1, 2, and 3 fibrillation (CMS/[facility])    7. CKD (chronic kidney disease) stage 3, GFR 30-59 ml/min    8. Essential hypertension, benign    9. Other hyperlipidemia    10. PMR (polymyalgia rheumatica) (CMS/[facility])    11. Alzheimer's dementia without behavioral disturbance, unspecified timing of dementia onset        Co-morbidities:   Patient Active Problem List   Diagnosis   • HTN   • Dyslipidemia   • Vitamin D deficiency   • CKD (chronic kidney disease) stage 3, GFR 30-59 ml/min   • DDD (degenerative disc disease), lumbar   • Anemia of chronic disease   • Plantar fasciitis   • Calcaneal spur   • Peripheral vascular disease, unspecified   • Pain in both feet   • Osteoarthritis   • PMR (polymyalgia rheumatica) (CMS/[facility])   • Late onset Alzheimer's disease with behavioral disturbance   • Iron deficiency anemia, unspecified   • Paroxysmal Atrial fibrillation, CHADS-VASc score: 4. 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   • Gait difficulty   • Balance disorder   • Dehydration   • Acute UTI   • Altered mental status   • Osteoarthritis of left glenohumeral joint       SUBJECTIVE: Subjective: \"I just want to go back home\" (06/25/18 1504)    OBJECTIVE:  Precautions:  Precautions  Other Precautions: safety, falls risk (06/25/18 1504)  Precautions Comments: dementia (06/23/18 0900)  RN reported Beard Fall Scale Score: 75 (>45 is considered at risk of fall)  Vitals:  Vital Signs: asymptomatic (06/25/18 1504)  Activity Tolerance:  limited by pain in left upper extremity  Exercise:       Functional Status (as of date/time noted)  ADLs:  Self Cares/ADL's  Eating Assistance: Set-up (06/23/18 0828)  Grooming Assistance: Supervision;Standing at sink (06/23/18 0828)  Oral Hygiene Assistance:  Supervision;Standing at sink (06/23/18 0828)  Grooming/Oral Hygiene Deficit: Increased time to complete;Supervision/Safety (06/23/18 0828)  Bathing Assistance: Minimal Assist (Min) (06/23/18 0828)  Bathing Deficit: Increased time to complete;Verbal cueing;Setup;Left lower leg including foot;Right lower leg including foot (06/23/18 0828)  Bathing Equipment Used: Chair at sink (06/23/18 0828)  Upper Body Dressing Assistance: Minimal Assist (Min) (06/23/18 0828)  Upper Body Dressing Deficit: Thread LUE;Increased time to complete;Verbal cueing;Setup (06/23/18 0828)  Lower Body Clothing Assistance: Minimal Assist (Min) (06/23/18 0828)  Footwear Assistance: Total Assist - Dependent (06/19/18 1505)  Lower Body Dressing Deficit: Increased time to complete;Setup;Verbal cueing;Supervision/Safety;Pull up over hips (06/23/18 0828)  Toileting Assistance: Modified independent (06/23/18 0828)  Toileting Deficit: Increased time to complete;Use of adaptive equipment;Supervision/Safety (06/23/18 0828)  Toileting Equipment Used: Toilet riser;Grab bar use (06/23/18 0828)  Self Cares/ADL's Comments #1: patient declines completion of self-cares, stating it was done earlier in the day and that she does not want to move left UE (06/25/18 1504)    Household Mobility:  Household Mobility  Rolling: Supervision (06/23/18 0828)  Supine to Sit: Supervision (06/25/18 1504)  Sit to Supine: Supervision (06/25/18 1504)  Bed to Chair: Supervision (06/25/18 1504)  Sit to Stand: Supervision (06/25/18 1504)  Stand to Sit: Supervision (06/25/18 1504)  Stand Pivot Transfers: Supervision (06/23/18 0828)  Toilet Transfers: Minimal Assist (Min) (06/23/18 0828)  Transfer Equipment: 2ww, grab bars  (06/23/18 0828)  Sitting - Static: Independent (06/25/18 1504)  Sitting - Dynamic: Independent (06/25/18 1504)  Standing - Static: Supervision (06/23/18 0828)  Standing - Dynamic: Supervision (06/23/18 0828)  Base of Support: Hip Width (06/25/18 1504)  Household  Mobility Comments #1: Patient requires minimum assist for sit to stand from lower surface, otherwise close supervision with use of cues and grab bars/rails. Patient complaints of achy left arm pain during session, require encouragement to utilize left upper extremity during transfers to improve overall safety. Patient agreeable with encouragement.  (06/23/18 0828)  Household Mobility Comments #2: Patient able to demonstrated 10 minutes standing tolerance during sinkside grooming with close supervision.  (06/23/18 0828)    Home Management:       See OT flowsheet for full details regarding the OT therapy provided.    GOALS:  Short Term Goals to Be Reviewed On: 06/26/18 (06/19/18 1505)  Short Term Goals Are The Same as Discharge Goals: Yes (06/19/18 1505)  Goal Agreement: Family/significant other/caregiver agrees (06/19/18 1505)  Grooming Discharge Goal 1: modified independent  (06/19/18 1505)  Bathing Discharge Goal 1: Modified independent for sponge bathing at the sink (06/19/18 1505)  Dressing Discharge Goal 1: Modified independent  (06/19/18 1505)  Toileting Discharge Goal 1: Modified independent  (06/19/18 1505)  Home Setting Transfer Discharge Goal 1: Modified independent with toilet transfers and household mobility with least restrictive device (06/19/18 1505)  UE Function Discharge Goal 1: Patient will achieve increased arom BUE for use in self cares (06/19/18 1505)    EDUCATION:   On this date, the patient was educated on importance of self-ROM stretching to LUE, importance of movement in the rehab process.    The response to education was: Needs reinforcement    RECOMMENDATIONS FOR DISCHARGE:  Recommendations for Discharge: OT: Post acute therapy (06/19/18 1505)    PT/OT Mobility Equipment for Discharge: continue to assess. Pt has no equipment at home (06/24/18 1021)  PT/OT ADL Equipment for Discharge: continue to assess (06/22/18 1443)

## 2022-03-11 ENCOUNTER — APPOINTMENT (OUTPATIENT)
Dept: ELECTROPHYSIOLOGY | Facility: CLINIC | Age: 77
End: 2022-03-11
Payer: MEDICARE

## 2022-03-11 ENCOUNTER — NON-APPOINTMENT (OUTPATIENT)
Age: 77
End: 2022-03-11

## 2022-03-11 VITALS
OXYGEN SATURATION: 99 % | BODY MASS INDEX: 28.44 KG/M2 | HEART RATE: 71 BPM | WEIGHT: 210 LBS | DIASTOLIC BLOOD PRESSURE: 80 MMHG | SYSTOLIC BLOOD PRESSURE: 128 MMHG | HEIGHT: 72 IN

## 2022-03-11 PROCEDURE — 93000 ELECTROCARDIOGRAM COMPLETE: CPT

## 2022-03-11 PROCEDURE — 99214 OFFICE O/P EST MOD 30 MIN: CPT

## 2022-03-11 RX ORDER — SUCRALFATE 1 G/10ML
1 SUSPENSION ORAL
Qty: 1 | Refills: 0 | Status: COMPLETED | COMMUNITY
Start: 2022-02-11 | End: 2022-03-11

## 2022-03-11 RX ORDER — TADALAFIL 5 MG/1
5 TABLET ORAL
Refills: 0 | Status: COMPLETED | COMMUNITY
Start: 2021-05-12 | End: 2022-03-11

## 2022-03-11 NOTE — HISTORY OF PRESENT ILLNESS
[FreeTextEntry1] : 76 year old male with history of DM, HLD, persistent atrial fibrillation -palpitations who underwent cardioversion in June 2021 and had a recurrence of Afib approximately 4 hours after cardioversion. He is s/p Sotalol load on 120mg bid and DCCV on 11/11/21.  Patient underwent RF catheter ablation for A. fib PV isolation posterior LA isolation and CTI flutter line on 2/9/2022. Patient is compliant with medications including Eliquis 5mg bid, denies any bleeding. since ablation for AF he is doing well denies any symptoms. \par Patient denies any CP, palpitations, SOB, dizziness, lightheadedness.\par ECG 3.11.22 SR 70 bpm, QTc 440ms\par ECG 11.22.21 Atrial fibrillation QTc stable 420ms \par \par cardiac work up:\par normal EF and non obstructive CAD on cardiac cath from 8/2021

## 2022-03-11 NOTE — DISCUSSION/SUMMARY
[FreeTextEntry1] : 76 year old male with history of DM, HLD, persistent atrial fibrillation -palpitations who underwent cardioversion in June 2021 and had a recurrence of Afib approximately 4 hours after cardioversion. He is s/p Sotalol load on 120mg bid and DCCV on 11/11/21.  Patient underwent RF catheter ablation for A. fib PV isolation posterior LA isolation and CTI flutter line on 2/9/2022.\par -remains in SR\par -cont' sotalol 120mg bid, eliquis 5mg bid, avoid other QT prolonging drugs \par Total length of time spent with this patient was 30 minutes and more then half of the time was spent face to face with the patient as well as counseling and coordination of care as stated above.\par

## 2022-05-26 NOTE — PROCEDURAL SAFETY CHECKLIST WITH OR WITHOUT SEDATION - NSPRESEDATION2FT_GEN_ALL_CORE
Anesthesia confirms case reviewed for anesthesia risk alert.
Is This A New Presentation, Or A Follow-Up?: Skin Lesion
What Type Of Note Output Would You Prefer (Optional)?: Standard Output
How Severe Is Your Skin Lesion?: mild
Has Your Skin Lesion Been Treated?: not been treated

## 2022-07-18 ENCOUNTER — RX RENEWAL (OUTPATIENT)
Age: 77
End: 2022-07-18

## 2022-07-18 ENCOUNTER — APPOINTMENT (OUTPATIENT)
Dept: ELECTROPHYSIOLOGY | Facility: CLINIC | Age: 77
End: 2022-07-18

## 2022-07-18 ENCOUNTER — NON-APPOINTMENT (OUTPATIENT)
Age: 77
End: 2022-07-18

## 2022-07-18 VITALS
OXYGEN SATURATION: 97 % | DIASTOLIC BLOOD PRESSURE: 76 MMHG | HEART RATE: 68 BPM | WEIGHT: 208 LBS | SYSTOLIC BLOOD PRESSURE: 124 MMHG | HEIGHT: 72 IN | BODY MASS INDEX: 28.17 KG/M2

## 2022-07-18 PROCEDURE — 99214 OFFICE O/P EST MOD 30 MIN: CPT | Mod: 25

## 2022-07-18 PROCEDURE — 93000 ELECTROCARDIOGRAM COMPLETE: CPT

## 2022-07-18 RX ORDER — PANTOPRAZOLE 40 MG/1
40 TABLET, DELAYED RELEASE ORAL DAILY
Qty: 45 | Refills: 0 | Status: COMPLETED | COMMUNITY
Start: 2022-02-23 | End: 2022-07-18

## 2022-07-18 NOTE — DISCUSSION/SUMMARY
[FreeTextEntry1] : 76 year old male with history of DM, HLD, persistent atrial fibrillation -palpitations who underwent cardioversion in June 2021 and had a recurrence of Afib approximately 4 hours after cardioversion. He is s/p Sotalol load on 120mg bid and DCCV on 11/11/21.  Patient underwent RF catheter ablation for A. fib PV isolation posterior LA isolation and CTI flutter line on 2/9/2022.\par -remains in SR\par -cont' sotalol 120mg bid, eliquis 5mg bid, avoid other QT prolonging drugs \par \par Total length of time spent with this patient was 30 minutes and more then half of the time was spent face to face with the patient as well as counseling and coordination of care as stated above.\par

## 2022-07-18 NOTE — HISTORY OF PRESENT ILLNESS
[FreeTextEntry1] : 76 year old male with history of DM, HLD, persistent atrial fibrillation -palpitations who underwent cardioversion in June 2021 and had a recurrence of Afib approximately 4 hours after cardioversion. He is s/p Sotalol load on 120mg bid and DCCV on 11/11/21.  Patient underwent RF catheter ablation for A. fib PV isolation posterior LA isolation and CTI flutter line on 2/9/2022. Patient is compliant with medications including Eliquis 5mg bid, denies any bleeding. since ablation for AF he is doing well denies any symptoms. \par Patient denies any CP, palpitations, SOB, dizziness, lightheadedness.\par \par ECG 7.18.22 SR 67 bpm QTc 410ms\par ECG 3.11.22 SR 70 bpm, QTc 440ms\par ECG 11.22.21 Atrial fibrillation QTc stable 420ms \par \par cardiac work up:\par normal EF and non obstructive CAD on cardiac cath from 8/2021

## 2022-07-26 ENCOUNTER — APPOINTMENT (OUTPATIENT)
Dept: FAMILY MEDICINE | Facility: CLINIC | Age: 77
End: 2022-07-26

## 2022-07-26 VITALS
WEIGHT: 210 LBS | TEMPERATURE: 97 F | BODY MASS INDEX: 28.44 KG/M2 | DIASTOLIC BLOOD PRESSURE: 72 MMHG | SYSTOLIC BLOOD PRESSURE: 102 MMHG | HEIGHT: 72 IN | OXYGEN SATURATION: 96 % | HEART RATE: 73 BPM

## 2022-07-26 PROCEDURE — 36415 COLL VENOUS BLD VENIPUNCTURE: CPT

## 2022-07-26 PROCEDURE — G0439: CPT

## 2022-07-26 NOTE — ASSESSMENT
[FreeTextEntry1] : PIA DE ANDA is a 76 year old male here for a physical exam.\par \par Patient has a history of atrial fibrillation, diabetes, essential hypertension, and hypercholesteremia. His A. fib was first diagnosed in 2018 but this resolved before he saw a cardiologist. He was back in A. fib at the time of his physical last year and he was again referred to cardiology. He had an unsuccessful cardioversion in 2021 followed by a catheter ablation for A. fib in 2/2022. He saw Dr. De León last week and was felt to be stable in sinus rhythm so he does not need an EKG today.\par \par His diabetes has not been well controlled. His last HgbA1c was 11.1 and this was done over a year ago. He feels that he is on too many medications. He is concerned about the cost of his medications, specifically Januvia. He reports irregular bowel movements which he feels is due to the medications. \par

## 2022-07-26 NOTE — HISTORY OF PRESENT ILLNESS
[FreeTextEntry1] : PIA DE ANDA is a 76 year old male here for a physical exam.  [de-identified] : His last physical exam was last year\par \par Vaccines:\par Tetanus is NOT up to date\par Pneumococcal vaccination is up to date\par Shingrix is NOT up to date\par COVID vaccine is up to date\par \par His last dentist visit was a few years ago\par His last eye doctor appointment was several years ago\par His last dermatologist visit was unknown\par \par Colon cancer screening is NOT up to date (last colonoscopy was 7/2010)\par \par His diet is moderately healthy\par Exercise: no formal exercise but he states he is very active

## 2022-07-26 NOTE — PHYSICAL EXAM
[No Acute Distress] : no acute distress [Well Nourished] : well nourished [Well Developed] : well developed [Well-Appearing] : well-appearing [Normal Sclera/Conjunctiva] : normal sclera/conjunctiva [PERRL] : pupils equal round and reactive to light [EOMI] : extraocular movements intact [Normal Outer Ear/Nose] : the outer ears and nose were normal in appearance [Normal Oropharynx] : the oropharynx was normal [No JVD] : no jugular venous distention [No Lymphadenopathy] : no lymphadenopathy [Supple] : supple [Thyroid Normal, No Nodules] : the thyroid was normal and there were no nodules present [No Respiratory Distress] : no respiratory distress  [Clear to Auscultation] : lungs were clear to auscultation bilaterally [No Accessory Muscle Use] : no accessory muscle use [Normal Rate] : normal rate  [Regular Rhythm] : with a regular rhythm [Normal S1, S2] : normal S1 and S2 [No Murmur] : no murmur heard [No Carotid Bruits] : no carotid bruits [No Abdominal Bruit] : a ~M bruit was not heard ~T in the abdomen [No Varicosities] : no varicosities [Pedal Pulses Present] : the pedal pulses are present [No Edema] : there was no peripheral edema [No Palpable Aorta] : no palpable aorta [No Extremity Clubbing/Cyanosis] : no extremity clubbing/cyanosis [Soft] : abdomen soft [Non Tender] : non-tender [No Masses] : no abdominal mass palpated [Non-distended] : non-distended [No HSM] : no HSM [Normal Bowel Sounds] : normal bowel sounds [Normal Posterior Cervical Nodes] : no posterior cervical lymphadenopathy [Normal Anterior Cervical Nodes] : no anterior cervical lymphadenopathy [No CVA Tenderness] : no CVA  tenderness [No Spinal Tenderness] : no spinal tenderness [No Joint Swelling] : no joint swelling [No Rash] : no rash [Grossly Normal Strength/Tone] : grossly normal strength/tone [Coordination Grossly Intact] : coordination grossly intact [No Focal Deficits] : no focal deficits [Normal Gait] : normal gait [Deep Tendon Reflexes (DTR)] : deep tendon reflexes were 2+ and symmetric [Normal Affect] : the affect was normal [Normal Insight/Judgement] : insight and judgment were intact

## 2022-07-26 NOTE — PLAN
[FreeTextEntry1] : Continue all medications as prescribed. Check labs as above. Will adjust any medications based upon lab results. If his HgbA1c is not controlled he may need additional medication but he is concerned about cost. Will consider Endocrinology referral as well.\par \par Reviewed age-appropriate preventive screening tests with patient. He is due for Tdap and Shingrix vaccination and should get these at the pharmacy. His last colonoscopy was in 2010 so he should ideally have another one now, though technically colon cancer screening is no longer required after age 75.\par \par Discussed clean eating (e.g. Mediterranean style diet) and recommendations for regular exercise/staying as physically active as possible.\par \par Reviewed importance of good self care (e.g. meditation, yoga, adequate rest, regular exercise, magnesium, clean eating, etc.).\par \par Follow up for next physical in one year.

## 2022-07-26 NOTE — HEALTH RISK ASSESSMENT
[0] : 2) Feeling down, depressed, or hopeless: Not at all (0) [PHQ-2 Negative - No further assessment needed] : PHQ-2 Negative - No further assessment needed [[id number]] : 0

## 2022-07-29 ENCOUNTER — NON-APPOINTMENT (OUTPATIENT)
Age: 77
End: 2022-07-29

## 2022-07-29 LAB
ALBUMIN SERPL ELPH-MCNC: 4.5 G/DL
ALP BLD-CCNC: 62 U/L
ALT SERPL-CCNC: 21 U/L
ANION GAP SERPL CALC-SCNC: 10 MMOL/L
AST SERPL-CCNC: 18 U/L
BILIRUB SERPL-MCNC: 0.6 MG/DL
BUN SERPL-MCNC: 17 MG/DL
CALCIUM SERPL-MCNC: 9.9 MG/DL
CHLORIDE SERPL-SCNC: 99 MMOL/L
CHOLEST SERPL-MCNC: 133 MG/DL
CO2 SERPL-SCNC: 26 MMOL/L
CREAT SERPL-MCNC: 0.74 MG/DL
CREAT SPEC-SCNC: 132 MG/DL
EGFR: 94 ML/MIN/1.73M2
ESTIMATED AVERAGE GLUCOSE: 226 MG/DL
GLUCOSE SERPL-MCNC: 265 MG/DL
HBA1C MFR BLD HPLC: 9.5 %
HDLC SERPL-MCNC: 45 MG/DL
LDLC SERPL CALC-MCNC: 55 MG/DL
MICROALBUMIN 24H UR DL<=1MG/L-MCNC: 1.4 MG/DL
MICROALBUMIN/CREAT 24H UR-RTO: 10 MG/G
NONHDLC SERPL-MCNC: 89 MG/DL
POTASSIUM SERPL-SCNC: 5.4 MMOL/L
PROT SERPL-MCNC: 7 G/DL
PSA SERPL-MCNC: 0.9 NG/ML
SODIUM SERPL-SCNC: 136 MMOL/L
TRIGL SERPL-MCNC: 171 MG/DL

## 2022-08-23 ENCOUNTER — RX RENEWAL (OUTPATIENT)
Age: 77
End: 2022-08-23

## 2022-09-09 ENCOUNTER — NON-APPOINTMENT (OUTPATIENT)
Age: 77
End: 2022-09-09

## 2023-01-30 ENCOUNTER — RX RENEWAL (OUTPATIENT)
Age: 78
End: 2023-01-30

## 2023-03-09 ENCOUNTER — NON-APPOINTMENT (OUTPATIENT)
Age: 78
End: 2023-03-09

## 2023-03-09 ENCOUNTER — APPOINTMENT (OUTPATIENT)
Dept: ELECTROPHYSIOLOGY | Facility: CLINIC | Age: 78
End: 2023-03-09
Payer: MEDICARE

## 2023-03-09 VITALS
BODY MASS INDEX: 28.44 KG/M2 | SYSTOLIC BLOOD PRESSURE: 152 MMHG | OXYGEN SATURATION: 99 % | HEIGHT: 72 IN | HEART RATE: 61 BPM | DIASTOLIC BLOOD PRESSURE: 84 MMHG | WEIGHT: 210 LBS

## 2023-03-09 PROCEDURE — 93000 ELECTROCARDIOGRAM COMPLETE: CPT

## 2023-03-09 PROCEDURE — 99214 OFFICE O/P EST MOD 30 MIN: CPT | Mod: 25

## 2023-03-09 RX ORDER — SITAGLIPTIN 100 MG/1
100 TABLET, FILM COATED ORAL
Qty: 90 | Refills: 1 | Status: DISCONTINUED | COMMUNITY
Start: 2021-07-07 | End: 2023-03-09

## 2023-03-09 NOTE — HISTORY OF PRESENT ILLNESS
[FreeTextEntry1] : 77 year old male with history of DM, HLD, persistent atrial fibrillation -palpitations who underwent cardioversion in June 2021 and had a recurrence of Afib approximately 4 hours after cardioversion. He is s/p Sotalol load on 120mg bid and DCCV on 11/11/21.  Patient underwent RF catheter ablation for A. fib PV isolation posterior LA isolation and CTI flutter line on 2/9/2022. Patient is compliant with medications including Eliquis 5mg bid, denies any bleeding. since ablation for AF he is doing well denies any symptoms. \par Patient denies any CP, palpitations, SOB, dizziness, lightheadedness.\par ecg 3.9.23 SR 60s bpm QTc 413ms\par ECG 7.18.22 SR 67 bpm QTc 410ms\par ECG 3.11.22 SR 70 bpm, QTc 440ms\par ECG 11.22.21 Atrial fibrillation QTc stable 420ms \par \par cardiac work up:\par normal EF and non obstructive CAD on cardiac cath from 8/2021

## 2023-03-09 NOTE — DISCUSSION/SUMMARY
[FreeTextEntry1] : 76 year old male with history of DM, HLD, persistent atrial fibrillation -palpitations who underwent cardioversion in June 2021 and had a recurrence of Afib approximately 4 hours after cardioversion. He is s/p Sotalol load on 120mg bid and DCCV on 11/11/21.  Patient underwent RF catheter ablation for A. fib PV isolation posterior LA isolation and CTI flutter line on 2/9/2022.\par -remains in SR, check HR with fitbid all the time and HR 50-60s no arrhythmia \par -cont' sotalol 120mg bid QT stable, eliquis 5mg bid, avoid other QT prolonging drugs \par \par Total length of time spent with this patient was 30 minutes and more then half of the time was spent face to face with the patient as well as counseling and coordination of care as stated above.\par

## 2023-05-02 ENCOUNTER — FORM ENCOUNTER (OUTPATIENT)
Age: 78
End: 2023-05-02

## 2023-05-03 ENCOUNTER — OFFICE (OUTPATIENT)
Dept: URBAN - METROPOLITAN AREA CLINIC 102 | Facility: CLINIC | Age: 78
Setting detail: OPHTHALMOLOGY
End: 2023-05-03
Payer: MEDICARE

## 2023-05-03 DIAGNOSIS — H35.40: ICD-10-CM

## 2023-05-03 DIAGNOSIS — H43.393: ICD-10-CM

## 2023-05-03 DIAGNOSIS — T85.22XA: ICD-10-CM

## 2023-05-03 DIAGNOSIS — E11.3213: ICD-10-CM

## 2023-05-03 PROCEDURE — 92134 CPTRZ OPH DX IMG PST SGM RTA: CPT | Performed by: OPHTHALMOLOGY

## 2023-05-03 PROCEDURE — 92201 OPSCPY EXTND RTA DRAW UNI/BI: CPT | Performed by: OPHTHALMOLOGY

## 2023-05-03 PROCEDURE — 92004 COMPRE OPH EXAM NEW PT 1/>: CPT | Performed by: OPHTHALMOLOGY

## 2023-05-03 ASSESSMENT — REFRACTION_MANIFEST
OD_CYLINDER: -0.75
OD_VA1: 20/20-1
OS_CYLINDER: -0.50
OS_VA1: 20/20
OD_SPHERE: +0.75
OD_AXIS: 116
OS_SPHERE: +2.00
OS_AXIS: 074

## 2023-05-03 ASSESSMENT — CONFRONTATIONAL VISUAL FIELD TEST (CVF)
OS_FINDINGS: FULL
OD_FINDINGS: FULL

## 2023-05-03 ASSESSMENT — TONOMETRY
OS_IOP_MMHG: 13
OD_IOP_MMHG: 12

## 2023-05-03 ASSESSMENT — REFRACTION_AUTOREFRACTION
OD_CYLINDER: -0.75
OS_AXIS: 074
OD_SPHERE: +0.75
OS_CYLINDER: -0.50
OS_SPHERE: +2.00
OD_AXIS: 116

## 2023-05-03 ASSESSMENT — KERATOMETRY
OS_AXISANGLE_DEGREES: 124
OS_K1POWER_DIOPTERS: 45.75
METHOD_AUTO_MANUAL: AUTO
OD_K2POWER_DIOPTERS: 43.75
OD_K1POWER_DIOPTERS: 43.25
OD_AXISANGLE_DEGREES: 006
OS_K2POWER_DIOPTERS: 46.25

## 2023-05-03 ASSESSMENT — SPHEQUIV_DERIVED
OD_SPHEQUIV: 0.375
OD_SPHEQUIV: 0.375
OS_SPHEQUIV: 1.75
OS_SPHEQUIV: 1.75

## 2023-05-03 ASSESSMENT — AXIALLENGTH_DERIVED
OS_AL: 22.0935
OS_AL: 22.0935
OD_AL: 23.4467
OD_AL: 23.4467

## 2023-05-03 ASSESSMENT — VISUAL ACUITY
OD_BCVA: 20/40-1
OS_BCVA: 20/20

## 2023-05-08 NOTE — ASU DISCHARGE PLAN (ADULT/PEDIATRIC). - COMMENTS
Please call Dr Shah office for appointment in 1-2 weeks
Lives in [place] with wife in a house. Retired ( / ). Endorses good diet and physically active. Denies smoking, EtOH or drug use. No recent travel.

## 2023-05-11 ENCOUNTER — OFFICE (OUTPATIENT)
Dept: URBAN - METROPOLITAN AREA CLINIC 32 | Facility: CLINIC | Age: 78
Setting detail: OPHTHALMOLOGY
End: 2023-05-11
Payer: MEDICARE

## 2023-05-11 DIAGNOSIS — H35.40: ICD-10-CM

## 2023-05-11 DIAGNOSIS — H43.813: ICD-10-CM

## 2023-05-11 DIAGNOSIS — E11.3291: ICD-10-CM

## 2023-05-11 DIAGNOSIS — H35.433: ICD-10-CM

## 2023-05-11 DIAGNOSIS — H43.393: ICD-10-CM

## 2023-05-11 DIAGNOSIS — E11.3292: ICD-10-CM

## 2023-05-11 DIAGNOSIS — H35.373: ICD-10-CM

## 2023-05-11 PROCEDURE — 92201 OPSCPY EXTND RTA DRAW UNI/BI: CPT | Performed by: OPHTHALMOLOGY

## 2023-05-11 PROCEDURE — 99213 OFFICE O/P EST LOW 20 MIN: CPT | Performed by: OPHTHALMOLOGY

## 2023-05-11 PROCEDURE — 92134 CPTRZ OPH DX IMG PST SGM RTA: CPT | Performed by: OPHTHALMOLOGY

## 2023-05-11 ASSESSMENT — KERATOMETRY
OS_K1POWER_DIOPTERS: 45.75
OD_K1POWER_DIOPTERS: 43.25
OD_K2POWER_DIOPTERS: 43.75
METHOD_AUTO_MANUAL: AUTO
OS_K2POWER_DIOPTERS: 46.25
OS_AXISANGLE_DEGREES: 124
OD_AXISANGLE_DEGREES: 006

## 2023-05-11 ASSESSMENT — AXIALLENGTH_DERIVED
OS_AL: 22.0935
OD_AL: 23.4467

## 2023-05-11 ASSESSMENT — VISUAL ACUITY
OS_BCVA: 20/25-2
OD_BCVA: 20/50-1

## 2023-05-11 ASSESSMENT — REFRACTION_AUTOREFRACTION
OS_CYLINDER: -0.50
OD_AXIS: 116
OS_AXIS: 074
OD_CYLINDER: -0.75
OD_SPHERE: +0.75
OS_SPHERE: +2.00

## 2023-05-11 ASSESSMENT — SPHEQUIV_DERIVED
OD_SPHEQUIV: 0.375
OS_SPHEQUIV: 1.75

## 2023-05-16 ENCOUNTER — RX RENEWAL (OUTPATIENT)
Age: 78
End: 2023-05-16

## 2023-05-22 ENCOUNTER — FORM ENCOUNTER (OUTPATIENT)
Age: 78
End: 2023-05-22

## 2023-05-23 ENCOUNTER — OFFICE (OUTPATIENT)
Dept: URBAN - METROPOLITAN AREA CLINIC 102 | Facility: CLINIC | Age: 78
Setting detail: OPHTHALMOLOGY
End: 2023-05-23
Payer: MEDICARE

## 2023-05-23 DIAGNOSIS — T85.22XA: ICD-10-CM

## 2023-05-23 PROBLEM — H43.813 POSTERIOR VITREOUS DETACHMENT; BOTH EYES: Status: ACTIVE | Noted: 2023-05-11

## 2023-05-23 PROBLEM — E11.3291 TYPE 2 DIABETES MELLITUS WITH MILD NONPROLIFERATIVE DIABETIC RETINOPATHY WITHOUT MACULAR EDEMA; RIGHT EYE, LEFT EYE: Status: ACTIVE | Noted: 2023-05-11

## 2023-05-23 PROBLEM — H35.40 PERIPAPILLARY ATROPHY ; BOTH EYES: Status: ACTIVE | Noted: 2023-05-03

## 2023-05-23 PROBLEM — H43.393 VITREOUS FLOATERS; BOTH EYES: Status: ACTIVE | Noted: 2023-05-03

## 2023-05-23 PROBLEM — H35.373 EPIRETINAL MEMBRANE; BOTH EYES: Status: ACTIVE | Noted: 2023-05-11

## 2023-05-23 PROBLEM — E11.3292 TYPE 2 DIABETES MELLITUS WITH MILD NONPROLIFERATIVE DIABETIC RETINOPATHY WITHOUT MACULAR EDEMA; RIGHT EYE, LEFT EYE: Status: ACTIVE | Noted: 2023-05-11

## 2023-05-23 PROBLEM — H35.433 PAVING STONE DEGENERATION; BOTH EYES: Status: ACTIVE | Noted: 2023-05-11

## 2023-05-23 PROCEDURE — 99213 OFFICE O/P EST LOW 20 MIN: CPT | Performed by: OPHTHALMOLOGY

## 2023-05-23 ASSESSMENT — KERATOMETRY
OD_AXISANGLE_DEGREES: 174
OD_K2POWER_DIOPTERS: 4.75
OS_AXISANGLE_DEGREES: 101
OD_K1POWER_DIOPTERS: 43.50
OS_K1POWER_DIOPTERS: 45.75
METHOD_AUTO_MANUAL: AUTO
OS_K2POWER_DIOPTERS: 46.50

## 2023-05-23 ASSESSMENT — SPHEQUIV_DERIVED
OS_SPHEQUIV: 1.375
OD_SPHEQUIV: 1

## 2023-05-23 ASSESSMENT — VISUAL ACUITY
OD_BCVA: 20/40-2
OS_BCVA: 20/25-

## 2023-05-23 ASSESSMENT — TONOMETRY
OS_IOP_MMHG: 12
OD_IOP_MMHG: 11

## 2023-05-23 ASSESSMENT — CONFRONTATIONAL VISUAL FIELD TEST (CVF)
OD_FINDINGS: FULL
OS_FINDINGS: FULL

## 2023-05-23 ASSESSMENT — REFRACTION_AUTOREFRACTION
OS_SPHERE: +1.50
OD_CYLINDER: -1.00
OD_AXIS: 105
OD_SPHERE: +1.50
OS_AXIS: 063
OS_CYLINDER: -0.25

## 2023-05-23 ASSESSMENT — AXIALLENGTH_DERIVED
OS_AL: 22.1817
OD_AL: 33

## 2023-07-20 ENCOUNTER — RX RENEWAL (OUTPATIENT)
Age: 78
End: 2023-07-20

## 2023-08-10 ENCOUNTER — OFFICE (OUTPATIENT)
Dept: URBAN - METROPOLITAN AREA CLINIC 104 | Facility: CLINIC | Age: 78
Setting detail: OPHTHALMOLOGY
End: 2023-08-10
Payer: MEDICARE

## 2023-08-10 DIAGNOSIS — H35.373: ICD-10-CM

## 2023-08-10 DIAGNOSIS — T85.22XA: ICD-10-CM

## 2023-08-10 DIAGNOSIS — H35.433: ICD-10-CM

## 2023-08-10 DIAGNOSIS — H43.393: ICD-10-CM

## 2023-08-10 DIAGNOSIS — H43.813: ICD-10-CM

## 2023-08-10 DIAGNOSIS — E11.3292: ICD-10-CM

## 2023-08-10 DIAGNOSIS — E11.3291: ICD-10-CM

## 2023-08-10 DIAGNOSIS — H35.40: ICD-10-CM

## 2023-08-10 PROCEDURE — 92136 OPHTHALMIC BIOMETRY: CPT | Performed by: OPHTHALMOLOGY

## 2023-08-10 PROCEDURE — 92014 COMPRE OPH EXAM EST PT 1/>: CPT | Performed by: OPHTHALMOLOGY

## 2023-08-10 ASSESSMENT — TONOMETRY
OS_IOP_MMHG: 12
OD_IOP_MMHG: 13

## 2023-08-10 ASSESSMENT — KERATOMETRY
OD_CYLPOWER_DEGREES: 0.46
OD_AXISANGLE_DEGREES: 143
METHOD_AUTO_MANUAL: AUTO
OS_K1POWER_DIOPTERS: 45.98
OS_K1POWER_DIOPTERS: 45.98
OS_K1K2_AVERAGE: 46.14
OS_AXISANGLE_DEGREES: 141
OD_CYLAXISANGLE_DEGREES: 143
OD_K1POWER_DIOPTERS: 43.66
OS_K2POWER_DIOPTERS: 46.30
OS_CYLAXISANGLE_DEGREES: 141
OD_K2POWER_DIOPTERS: 44.12
OS_CYLPOWER_DEGREES: 0.32
OS_AXISANGLE_DEGREES: 51
OD_K1K2_AVERAGE: 43.89
OS_K2POWER_DIOPTERS: 46.30
OD_AXISANGLE2_DEGREES: 143
OS_AXISANGLE2_DEGREES: 141
OD_K2POWER_DIOPTERS: 44.12
OD_AXISANGLE_DEGREES: 53
OD_K1POWER_DIOPTERS: 43.66

## 2023-08-10 ASSESSMENT — VISUAL ACUITY
OS_BCVA: 20/25-
OD_BCVA: 20/70-1

## 2023-08-10 ASSESSMENT — AXIALLENGTH_DERIVED
OD_AL: 23.2586
OS_AL: 22.31

## 2023-08-10 ASSESSMENT — SPHEQUIV_DERIVED
OD_SPHEQUIV: 0.5
OS_SPHEQUIV: 1

## 2023-08-10 ASSESSMENT — REFRACTION_AUTOREFRACTION
OD_AXIS: 136
OS_CYLINDER: -0.50
OD_CYLINDER: -0.50
OS_AXIS: 051
OD_SPHERE: +0.75
OS_SPHERE: +1.25

## 2023-08-10 ASSESSMENT — CONFRONTATIONAL VISUAL FIELD TEST (CVF)
OS_FINDINGS: FULL
OD_FINDINGS: FULL

## 2023-08-29 ENCOUNTER — APPOINTMENT (OUTPATIENT)
Dept: GASTROENTEROLOGY | Facility: CLINIC | Age: 78
End: 2023-08-29

## 2023-08-31 ENCOUNTER — APPOINTMENT (OUTPATIENT)
Dept: GASTROENTEROLOGY | Facility: CLINIC | Age: 78
End: 2023-08-31
Payer: MEDICARE

## 2023-08-31 VITALS
DIASTOLIC BLOOD PRESSURE: 68 MMHG | HEIGHT: 72 IN | WEIGHT: 215 LBS | BODY MASS INDEX: 29.12 KG/M2 | SYSTOLIC BLOOD PRESSURE: 122 MMHG

## 2023-08-31 DIAGNOSIS — R13.19 OTHER DYSPHAGIA: ICD-10-CM

## 2023-08-31 DIAGNOSIS — Z12.11 ENCOUNTER FOR SCREENING FOR MALIGNANT NEOPLASM OF COLON: ICD-10-CM

## 2023-08-31 PROCEDURE — 99214 OFFICE O/P EST MOD 30 MIN: CPT

## 2023-08-31 RX ORDER — SODIUM SULFATE, POTASSIUM SULFATE AND MAGNESIUM SULFATE 1.6; 3.13; 17.5 G/177ML; G/177ML; G/177ML
17.5-3.13-1.6 SOLUTION ORAL
Qty: 2 | Refills: 0 | Status: ACTIVE | COMMUNITY
Start: 2023-08-31 | End: 1900-01-01

## 2023-08-31 NOTE — HISTORY OF PRESENT ILLNESS
[FreeTextEntry1] : Marlon Lockhart is a 77 year old male PMH HTN, HLD, DM type 2 on glipazide and metformin presents today for consult for screening colonoscopy. Has not had colonoscopy in 10 years. Denies FMH of CRC. Denies bowel complaints, typically has bowel movements regularly without significant straining or overt bleeding such as melena or hematochezia. Also notes that pt has history of food impaction, reports some intermittent dysphagia since then. Denies unintentional weight loss.

## 2023-08-31 NOTE — ASSESSMENT
[FreeTextEntry1] : Plan: Since pt has not had screening colonoscopy in 10 years, would recommend. Also discussed EGD since pt reports some intermittent dysphagia. Risks versus benefits as well as instructions reviewed, pt agrees to planned procedure. Discussed getting cardiac clearance to hold eliquis and clearance from his PCP to hold glipazide and metformin. Seen and discussed with Dr. Shah, I have spent 30 minutes on this encounter.

## 2023-10-26 ENCOUNTER — APPOINTMENT (OUTPATIENT)
Dept: FAMILY MEDICINE | Facility: CLINIC | Age: 78
End: 2023-10-26
Payer: MEDICARE

## 2023-10-26 VITALS
HEART RATE: 83 BPM | TEMPERATURE: 97.2 F | OXYGEN SATURATION: 98 % | BODY MASS INDEX: 29.99 KG/M2 | DIASTOLIC BLOOD PRESSURE: 80 MMHG | HEIGHT: 72 IN | SYSTOLIC BLOOD PRESSURE: 122 MMHG | WEIGHT: 221.38 LBS

## 2023-10-26 DIAGNOSIS — E78.00 PURE HYPERCHOLESTEROLEMIA, UNSPECIFIED: ICD-10-CM

## 2023-10-26 DIAGNOSIS — I10 ESSENTIAL (PRIMARY) HYPERTENSION: ICD-10-CM

## 2023-10-26 DIAGNOSIS — E11.9 TYPE 2 DIABETES MELLITUS W/OUT COMPLICATIONS: ICD-10-CM

## 2023-10-26 DIAGNOSIS — Z23 ENCOUNTER FOR IMMUNIZATION: ICD-10-CM

## 2023-10-26 DIAGNOSIS — I48.91 UNSPECIFIED ATRIAL FIBRILLATION: ICD-10-CM

## 2023-10-26 DIAGNOSIS — Z00.00 ENCOUNTER FOR GENERAL ADULT MEDICAL EXAMINATION W/OUT ABNORMAL FINDINGS: ICD-10-CM

## 2023-10-26 PROCEDURE — 90686 IIV4 VACC NO PRSV 0.5 ML IM: CPT

## 2023-10-26 PROCEDURE — G0438: CPT

## 2023-10-26 PROCEDURE — 36415 COLL VENOUS BLD VENIPUNCTURE: CPT

## 2023-10-26 PROCEDURE — G0008: CPT

## 2023-10-29 LAB
ALBUMIN SERPL ELPH-MCNC: 4.7 G/DL
ALP BLD-CCNC: 54 U/L
ALT SERPL-CCNC: 16 U/L
ANION GAP SERPL CALC-SCNC: 13 MMOL/L
AST SERPL-CCNC: 16 U/L
BASOPHILS # BLD AUTO: 0.05 K/UL
BASOPHILS NFR BLD AUTO: 0.8 %
BILIRUB SERPL-MCNC: 0.6 MG/DL
BUN SERPL-MCNC: 18 MG/DL
CALCIUM SERPL-MCNC: 9.8 MG/DL
CHLORIDE SERPL-SCNC: 101 MMOL/L
CHOLEST SERPL-MCNC: 158 MG/DL
CO2 SERPL-SCNC: 24 MMOL/L
CREAT SERPL-MCNC: 0.82 MG/DL
CREAT SPEC-SCNC: 153 MG/DL
EGFR: 91 ML/MIN/1.73M2
EOSINOPHIL # BLD AUTO: 0.1 K/UL
EOSINOPHIL NFR BLD AUTO: 1.6 %
ESTIMATED AVERAGE GLUCOSE: 212 MG/DL
GLUCOSE SERPL-MCNC: 279 MG/DL
HBA1C MFR BLD HPLC: 9 %
HCT VFR BLD CALC: 43.4 %
HDLC SERPL-MCNC: 52 MG/DL
HGB BLD-MCNC: 14.4 G/DL
IMM GRANULOCYTES NFR BLD AUTO: 0.2 %
LDLC SERPL CALC-MCNC: 88 MG/DL
LYMPHOCYTES # BLD AUTO: 1.2 K/UL
LYMPHOCYTES NFR BLD AUTO: 19 %
MAN DIFF?: NORMAL
MCHC RBC-ENTMCNC: 33 PG
MCHC RBC-ENTMCNC: 33.2 GM/DL
MCV RBC AUTO: 99.5 FL
MICROALBUMIN 24H UR DL<=1MG/L-MCNC: 2.3 MG/DL
MICROALBUMIN/CREAT 24H UR-RTO: 15 MG/G
MONOCYTES # BLD AUTO: 0.46 K/UL
MONOCYTES NFR BLD AUTO: 7.3 %
NEUTROPHILS # BLD AUTO: 4.48 K/UL
NEUTROPHILS NFR BLD AUTO: 71.1 %
NONHDLC SERPL-MCNC: 106 MG/DL
PLATELET # BLD AUTO: 191 K/UL
POTASSIUM SERPL-SCNC: 5.8 MMOL/L
PROT SERPL-MCNC: 7.5 G/DL
PSA SERPL-MCNC: 1.02 NG/ML
RBC # BLD: 4.36 M/UL
RBC # FLD: 12.6 %
SODIUM SERPL-SCNC: 138 MMOL/L
TRIGL SERPL-MCNC: 97 MG/DL
WBC # FLD AUTO: 6.3 K/UL

## 2023-11-01 ENCOUNTER — NON-APPOINTMENT (OUTPATIENT)
Age: 78
End: 2023-11-01

## 2023-11-13 ENCOUNTER — RX RENEWAL (OUTPATIENT)
Age: 78
End: 2023-11-13

## 2023-12-06 ENCOUNTER — RX RENEWAL (OUTPATIENT)
Age: 78
End: 2023-12-06

## 2023-12-06 RX ORDER — AMLODIPINE BESYLATE 10 MG/1
10 TABLET ORAL DAILY
Qty: 100 | Refills: 2 | Status: ACTIVE | COMMUNITY
Start: 2022-02-15 | End: 1900-01-01

## 2023-12-15 RX ORDER — APIXABAN 5 MG/1
5 TABLET, FILM COATED ORAL
Qty: 60 | Refills: 3 | Status: ACTIVE | COMMUNITY
Start: 2021-11-22 | End: 1900-01-01

## 2024-01-11 ENCOUNTER — RESULT REVIEW (OUTPATIENT)
Age: 79
End: 2024-01-11

## 2024-01-11 ENCOUNTER — NON-APPOINTMENT (OUTPATIENT)
Age: 79
End: 2024-01-11

## 2024-01-11 ENCOUNTER — APPOINTMENT (OUTPATIENT)
Dept: GASTROENTEROLOGY | Facility: AMBULATORY MEDICAL SERVICES | Age: 79
End: 2024-01-11
Payer: MEDICARE

## 2024-01-11 DIAGNOSIS — K29.90 GASTRODUODENITIS, UNSPECIFIED, W/OUT BLEEDING: ICD-10-CM

## 2024-01-11 PROCEDURE — 43249 ESOPH EGD DILATION <30 MM: CPT | Mod: GC

## 2024-01-11 PROCEDURE — 45385 COLONOSCOPY W/LESION REMOVAL: CPT | Mod: GC

## 2024-01-11 PROCEDURE — 43239 EGD BIOPSY SINGLE/MULTIPLE: CPT | Mod: GC,59

## 2024-01-11 PROCEDURE — 45380 COLONOSCOPY AND BIOPSY: CPT | Mod: GC,59

## 2024-01-19 RX ORDER — PANTOPRAZOLE 40 MG/1
40 TABLET, DELAYED RELEASE ORAL
Qty: 90 | Refills: 0 | Status: ACTIVE | COMMUNITY
Start: 2024-01-11 | End: 1900-01-01

## 2024-01-22 ENCOUNTER — RX RENEWAL (OUTPATIENT)
Age: 79
End: 2024-01-22

## 2024-01-22 RX ORDER — GLIPIZIDE 10 MG/1
10 TABLET, FILM COATED, EXTENDED RELEASE ORAL
Qty: 90 | Refills: 3 | Status: ACTIVE | COMMUNITY
Start: 2022-08-23 | End: 1900-01-01

## 2024-02-06 NOTE — ASU PREOP CHECKLIST - TEMPERATURE IN CELSIUS (DEGREES C)
36.7 Left knee surgical arthroscopy with partial medial meniscectomy is planned for 2/16/2024.  Diagnostic testing performed today  pre op instructions were reviewed  best wishes offered

## 2024-02-14 ENCOUNTER — RX RENEWAL (OUTPATIENT)
Age: 79
End: 2024-02-14

## 2024-02-14 RX ORDER — ROSUVASTATIN CALCIUM 5 MG/1
5 TABLET, FILM COATED ORAL
Qty: 100 | Refills: 2 | Status: ACTIVE | COMMUNITY
Start: 2023-06-16 | End: 1900-01-01

## 2024-03-14 ENCOUNTER — RX RENEWAL (OUTPATIENT)
Age: 79
End: 2024-03-14

## 2024-03-14 RX ORDER — SOTALOL HYDROCHLORIDE 120 MG/1
120 TABLET ORAL TWICE DAILY
Qty: 200 | Refills: 2 | Status: ACTIVE | COMMUNITY
Start: 2022-07-18 | End: 1900-01-01

## 2024-03-18 ENCOUNTER — APPOINTMENT (OUTPATIENT)
Dept: ELECTROPHYSIOLOGY | Facility: CLINIC | Age: 79
End: 2024-03-18

## 2024-03-19 ENCOUNTER — RX RENEWAL (OUTPATIENT)
Age: 79
End: 2024-03-19

## 2024-04-10 ENCOUNTER — RX RENEWAL (OUTPATIENT)
Age: 79
End: 2024-04-10

## 2024-04-10 RX ORDER — METFORMIN ER 500 MG 500 MG/1
500 TABLET ORAL
Qty: 400 | Refills: 2 | Status: ACTIVE | COMMUNITY
Start: 2021-05-12 | End: 1900-01-01

## 2024-08-27 ENCOUNTER — RX RENEWAL (OUTPATIENT)
Age: 79
End: 2024-08-27

## 2024-11-20 ENCOUNTER — RX RENEWAL (OUTPATIENT)
Age: 79
End: 2024-11-20

## 2024-12-23 ENCOUNTER — APPOINTMENT (OUTPATIENT)
Dept: ELECTROPHYSIOLOGY | Facility: CLINIC | Age: 79
End: 2024-12-23

## 2024-12-24 PROBLEM — F10.90 ALCOHOL USE: Status: ACTIVE | Noted: 2021-05-13

## 2025-02-14 ENCOUNTER — RX RENEWAL (OUTPATIENT)
Age: 80
End: 2025-02-14

## 2025-04-28 ENCOUNTER — RX RENEWAL (OUTPATIENT)
Age: 80
End: 2025-04-28

## 2025-06-09 ENCOUNTER — NON-APPOINTMENT (OUTPATIENT)
Age: 80
End: 2025-06-09

## 2025-06-10 ENCOUNTER — APPOINTMENT (OUTPATIENT)
Dept: FAMILY MEDICINE | Facility: CLINIC | Age: 80
End: 2025-06-10
Payer: MEDICARE

## 2025-06-10 VITALS
WEIGHT: 217 LBS | SYSTOLIC BLOOD PRESSURE: 118 MMHG | OXYGEN SATURATION: 96 % | BODY MASS INDEX: 29.39 KG/M2 | HEART RATE: 70 BPM | TEMPERATURE: 97.4 F | HEIGHT: 72 IN | DIASTOLIC BLOOD PRESSURE: 80 MMHG

## 2025-06-10 PROCEDURE — G0439: CPT

## 2025-06-10 PROCEDURE — 36415 COLL VENOUS BLD VENIPUNCTURE: CPT

## 2025-06-10 RX ORDER — SAW/PYGEUM/BETA/HERB/D3/B6/ZN 30 MG-25MG
CAPSULE ORAL
Refills: 0 | Status: ACTIVE | COMMUNITY

## 2025-06-10 RX ORDER — SILDENAFIL 100 MG/1
100 TABLET, FILM COATED ORAL
Qty: 18 | Refills: 3 | Status: ACTIVE | COMMUNITY
Start: 2025-06-10 | End: 1900-01-01

## 2025-06-10 RX ORDER — THIAMINE HCL 100 MG
500 TABLET ORAL
Refills: 0 | Status: ACTIVE | COMMUNITY

## 2025-06-11 LAB
ALBUMIN SERPL ELPH-MCNC: 4.6 G/DL
ALP BLD-CCNC: 59 U/L
ALT SERPL-CCNC: 18 U/L
ANION GAP SERPL CALC-SCNC: 15 MMOL/L
AST SERPL-CCNC: 16 U/L
BASOPHILS # BLD AUTO: 0.05 K/UL
BASOPHILS NFR BLD AUTO: 0.8 %
BILIRUB SERPL-MCNC: 0.7 MG/DL
BUN SERPL-MCNC: 19 MG/DL
CALCIUM SERPL-MCNC: 9.8 MG/DL
CHLORIDE SERPL-SCNC: 102 MMOL/L
CHOLEST SERPL-MCNC: 143 MG/DL
CO2 SERPL-SCNC: 22 MMOL/L
CREAT SERPL-MCNC: 0.82 MG/DL
CREAT SPEC-SCNC: 233 MG/DL
EGFRCR SERPLBLD CKD-EPI 2021: 90 ML/MIN/1.73M2
EOSINOPHIL # BLD AUTO: 0.22 K/UL
EOSINOPHIL NFR BLD AUTO: 3.5 %
ESTIMATED AVERAGE GLUCOSE: 226 MG/DL
GLUCOSE SERPL-MCNC: 211 MG/DL
HBA1C MFR BLD HPLC: 9.5 %
HCT VFR BLD CALC: 43.3 %
HDLC SERPL-MCNC: 44 MG/DL
HGB BLD-MCNC: 14 G/DL
IMM GRANULOCYTES NFR BLD AUTO: 0.2 %
LDLC SERPL-MCNC: 76 MG/DL
LYMPHOCYTES # BLD AUTO: 1.31 K/UL
LYMPHOCYTES NFR BLD AUTO: 20.8 %
MAN DIFF?: NORMAL
MCHC RBC-ENTMCNC: 31.9 PG
MCHC RBC-ENTMCNC: 32.3 G/DL
MCV RBC AUTO: 98.6 FL
MICROALBUMIN 24H UR DL<=1MG/L-MCNC: 5.4 MG/DL
MICROALBUMIN/CREAT 24H UR-RTO: 23 MG/G
MONOCYTES # BLD AUTO: 0.56 K/UL
MONOCYTES NFR BLD AUTO: 8.9 %
NEUTROPHILS # BLD AUTO: 4.15 K/UL
NEUTROPHILS NFR BLD AUTO: 65.8 %
NONHDLC SERPL-MCNC: 98 MG/DL
PLATELET # BLD AUTO: 228 K/UL
POTASSIUM SERPL-SCNC: 5.2 MMOL/L
PROT SERPL-MCNC: 7.3 G/DL
PSA SERPL-MCNC: 1.03 NG/ML
RBC # BLD: 4.39 M/UL
RBC # FLD: 12.6 %
SODIUM SERPL-SCNC: 138 MMOL/L
TRIGL SERPL-MCNC: 122 MG/DL
WBC # FLD AUTO: 6.3 K/UL

## 2025-06-18 RX ORDER — DAPAGLIFLOZIN 5 MG/1
5 TABLET, FILM COATED ORAL
Qty: 90 | Refills: 1 | Status: ACTIVE | COMMUNITY
Start: 2025-06-12 | End: 1900-01-01

## 2025-07-02 RX ORDER — SITAGLIPTIN 100 MG/1
100 TABLET ORAL
Qty: 90 | Refills: 3 | Status: ACTIVE | OUTPATIENT
Start: 2025-06-23

## 2025-07-17 ENCOUNTER — RX RENEWAL (OUTPATIENT)
Age: 80
End: 2025-07-17